# Patient Record
Sex: FEMALE | Race: WHITE | NOT HISPANIC OR LATINO | Employment: FULL TIME | URBAN - METROPOLITAN AREA
[De-identification: names, ages, dates, MRNs, and addresses within clinical notes are randomized per-mention and may not be internally consistent; named-entity substitution may affect disease eponyms.]

---

## 2021-08-03 ENCOUNTER — NON-PROVIDER VISIT (OUTPATIENT)
Dept: URGENT CARE | Facility: CLINIC | Age: 31
End: 2021-08-03

## 2021-08-03 DIAGNOSIS — Z02.1 PRE-EMPLOYMENT DRUG SCREENING: ICD-10-CM

## 2021-08-03 LAB
AMP AMPHETAMINE: NORMAL
COC COCAINE: NORMAL
INT CON NEG: NORMAL
INT CON POS: NORMAL
MET METHAMPHETAMINES: NORMAL
OPI OPIATES: NORMAL
PCP PHENCYCLIDINE: NORMAL
POC DRUG COMMENT 753798-OCCUPATIONAL HEALTH: NEGATIVE
THC: NORMAL

## 2021-08-03 PROCEDURE — 80305 DRUG TEST PRSMV DIR OPT OBS: CPT | Performed by: NURSE PRACTITIONER

## 2022-10-12 ENCOUNTER — HOSPITAL ENCOUNTER (INPATIENT)
Facility: REHABILITATION | Age: 32
End: 2022-10-12
Attending: PHYSICAL MEDICINE & REHABILITATION | Admitting: PHYSICAL MEDICINE & REHABILITATION
Payer: COMMERCIAL

## 2023-04-26 ENCOUNTER — APPOINTMENT (RX ONLY)
Dept: URBAN - METROPOLITAN AREA CLINIC 6 | Facility: CLINIC | Age: 33
Setting detail: DERMATOLOGY
End: 2023-04-26

## 2023-04-26 DIAGNOSIS — Z71.89 OTHER SPECIFIED COUNSELING: ICD-10-CM

## 2023-04-26 DIAGNOSIS — D485 NEOPLASM OF UNCERTAIN BEHAVIOR OF SKIN: ICD-10-CM

## 2023-04-26 PROBLEM — D48.5 NEOPLASM OF UNCERTAIN BEHAVIOR OF SKIN: Status: ACTIVE | Noted: 2023-04-26

## 2023-04-26 PROCEDURE — ? DIAGNOSIS COMMENT

## 2023-04-26 PROCEDURE — ? COUNSELING

## 2023-04-26 PROCEDURE — 99202 OFFICE O/P NEW SF 15 MIN: CPT

## 2023-04-26 PROCEDURE — ? PHOTO-DOCUMENTATION

## 2023-04-26 ASSESSMENT — LOCATION ZONE DERM: LOCATION ZONE: FACE

## 2023-04-26 ASSESSMENT — LOCATION DETAILED DESCRIPTION DERM
LOCATION DETAILED: RIGHT CENTRAL MALAR CHEEK
LOCATION DETAILED: RIGHT SUPERIOR MEDIAL BUCCAL CHEEK

## 2023-04-26 ASSESSMENT — LOCATION SIMPLE DESCRIPTION DERM: LOCATION SIMPLE: RIGHT CHEEK

## 2023-04-26 NOTE — PROCEDURE: PHOTO-DOCUMENTATION
Photo Preface (Leave Blank If You Do Not Want): Photographs were obtained today
Detail Level: Zone
Details (Free Text): Size 4mm

## 2023-04-26 NOTE — PROCEDURE: DIAGNOSIS COMMENT
Comment: Will recheck in 3 month for any changes .
Detail Level: Simple
Render Risk Assessment In Note?: no

## 2023-08-09 ENCOUNTER — APPOINTMENT (RX ONLY)
Dept: URBAN - METROPOLITAN AREA CLINIC 6 | Facility: CLINIC | Age: 33
Setting detail: DERMATOLOGY
End: 2023-08-09

## 2023-08-09 DIAGNOSIS — D22 MELANOCYTIC NEVI: ICD-10-CM

## 2023-08-09 DIAGNOSIS — Z71.89 OTHER SPECIFIED COUNSELING: ICD-10-CM

## 2023-08-09 PROBLEM — D22.39 MELANOCYTIC NEVI OF OTHER PARTS OF FACE: Status: ACTIVE | Noted: 2023-08-09

## 2023-08-09 PROCEDURE — 99212 OFFICE O/P EST SF 10 MIN: CPT

## 2023-08-09 PROCEDURE — ? COUNSELING

## 2023-08-09 ASSESSMENT — LOCATION DETAILED DESCRIPTION DERM
LOCATION DETAILED: RIGHT LATERAL MALAR CHEEK
LOCATION DETAILED: RIGHT INFERIOR MEDIAL UPPER BACK

## 2023-08-09 ASSESSMENT — LOCATION ZONE DERM
LOCATION ZONE: FACE
LOCATION ZONE: TRUNK

## 2023-08-09 ASSESSMENT — LOCATION SIMPLE DESCRIPTION DERM
LOCATION SIMPLE: RIGHT CHEEK
LOCATION SIMPLE: RIGHT UPPER BACK

## 2024-05-17 ENCOUNTER — HOSPITAL ENCOUNTER (OUTPATIENT)
Facility: MEDICAL CENTER | Age: 34
End: 2024-05-17
Attending: OBSTETRICS & GYNECOLOGY
Payer: COMMERCIAL

## 2024-05-17 ENCOUNTER — INITIAL PRENATAL (OUTPATIENT)
Dept: OBGYN | Facility: CLINIC | Age: 34
End: 2024-05-17
Payer: COMMERCIAL

## 2024-05-17 VITALS — SYSTOLIC BLOOD PRESSURE: 136 MMHG | WEIGHT: 210.6 LBS | DIASTOLIC BLOOD PRESSURE: 106 MMHG

## 2024-05-17 DIAGNOSIS — Z34.01 PRIMIGRAVIDA IN FIRST TRIMESTER: Primary | ICD-10-CM

## 2024-05-17 DIAGNOSIS — R03.0 ELEVATED BLOOD PRESSURE READING: ICD-10-CM

## 2024-05-17 DIAGNOSIS — O09.521 MULTIGRAVIDA OF ADVANCED MATERNAL AGE IN FIRST TRIMESTER: ICD-10-CM

## 2024-05-17 PROCEDURE — 3080F DIAST BP >= 90 MM HG: CPT | Performed by: OBSTETRICS & GYNECOLOGY

## 2024-05-17 PROCEDURE — 0501F PRENATAL FLOW SHEET: CPT | Performed by: OBSTETRICS & GYNECOLOGY

## 2024-05-17 PROCEDURE — 3075F SYST BP GE 130 - 139MM HG: CPT | Performed by: OBSTETRICS & GYNECOLOGY

## 2024-05-17 ASSESSMENT — EDINBURGH POSTNATAL DEPRESSION SCALE (EPDS)
I HAVE BEEN SO UNHAPPY THAT I HAVE HAD DIFFICULTY SLEEPING: NOT AT ALL
I HAVE LOOKED FORWARD WITH ENJOYMENT TO THINGS: AS MUCH AS I EVER DID
TOTAL SCORE: 1
THINGS HAVE BEEN GETTING ON TOP OF ME: NO, I HAVE BEEN COPING AS WELL AS EVER
I HAVE BEEN ABLE TO LAUGH AND SEE THE FUNNY SIDE OF THINGS: AS MUCH AS I ALWAYS COULD
I HAVE BEEN ANXIOUS OR WORRIED FOR NO GOOD REASON: HARDLY EVER
I HAVE BEEN SO UNHAPPY THAT I HAVE BEEN CRYING: NO, NEVER
THE THOUGHT OF HARMING MYSELF HAS OCCURRED TO ME: NEVER
I HAVE FELT SAD OR MISERABLE: NO, NOT AT ALL
I HAVE FELT SCARED OR PANICKY FOR NO GOOD REASON: NO, NOT AT ALL
I HAVE BLAMED MYSELF UNNECESSARILY WHEN THINGS WENT WRONG: NO, NEVER

## 2024-05-17 NOTE — PROGRESS NOTES
Establish Pregnancy Visit    CC: First OB Visit    HPI: Patient is a 33 y.o.  at 11w4d by LMP who presents for her first OB visit.  She is not yet feeling fetal movement.  She denies vaginal bleeding or persistent cramping.  She has not had a lot of pregnancy symptoms such as nausea/vomiting or fatigue.  Overall, she has been doing well.  She presents with her partner, Colin and they are excited about the pregnancy.    She has a past medical history of chlamydia, but no history of herpes.    Procedure:  Transvaginal US performed by me and per my read:    Indication: Amenorrhea, +UPT.     Findings:   [place] intrauterine pregnancy @ 12w1d by CRL.   Positive fetal cardiac activity      Impression:   Viable IUP @ 12w1d.  DAVID by US of 2024.  DAVID by LMP: 2024  Final DAVID: 2024    GYN HX:   Last Pap:  neg  Hx Moderate or Severe Dysplasia : no  Hx STD : yes - chlamydia    OBSTETRIC HISTORY:  OB History    Para Term  AB Living   1             SAB IAB Ectopic Molar Multiple Live Births                    # Outcome Date GA Lbr Altaf/2nd Weight Sex Delivery Anes PTL Lv   1 Current                MEDICAL HISTORY:  History reviewed. No pertinent past medical history.    MEDICATIONS:  No current outpatient medications on file prior to visit.     No current facility-administered medications on file prior to visit.       FAMILY HISTORY:  Family History   Problem Relation Age of Onset    Heart Disease Father        SURGICAL HISTORY:  History reviewed. No pertinent surgical history.    ALLERGIES / REACTIONS:  Not on File             SOCIAL HISTORY:   reports that she has never smoked. She has never used smokeless tobacco. She reports that she does not drink alcohol and does not use drugs.    ROS:   Gen: no fevers or chills, no significant weight loss or gain, excessive fatigue  Respiratory:  no cough or dyspnea  Cardiac:  no chest pain, no palpitations, no syncope  Breast: no breast discharge,  pain, lump or skin changes  GI:  no heartburn, no abdominal pain, no nausea or vomiting  Urinary: no dysuria, urgency, frequency, incontinence   Psych: no depression or anxiety  Neuro: no migraines with aura, fainting spells, numbness or tingling  Extremities: no joint pain, persistently swollen ankles, recurrent leg cramps       PHYSICAL EXAMINATION:  Vital Signs:   Vitals:    05/17/24 0823   BP: (!) 136/106   Weight: 210 lb 9.6 oz     There is no height or weight on file to calculate BMI.  Constitutional: The patient is well developed and well nourished.  Psychiatric: Patient is oriented to time place and person.   Skin: No rash observed.  Neck: Neck appears symmetric.   Respiratory: normal effort  Abdomen: Soft, non-tender.  Pelvic:    Vulva: normal.    Urethra: normal.   Vagina: normal.    Cervix: normal.    Uterus: consistent with dates    Adnexa: normal.   Perineum: normal.   GC / Chlamydia cultures obtained.   Pap Smear Obtained: yes  Extremeties: Legs are symmetric and without tenderness. There is no edema present.    ACOG SCREENING  Infection Prevention  1. High Risk For HIV: No 6. Rash Or Illness Since LMP: No     2. High Risk For Hepatitis B or C: No 7. History Of STD, GC, Chlamydia, HPV Syphilis: Yes (Comment: 2011 Chlamydia)     3. Live With Someone With TB Or Exposed To TB: No 8. Have a cat in the home?: Yes     4. Patient Or Partner Has A History Of Herpes: No 8a. Responsible for changing the litter?: No (Comment: Spouse changes litter)     5. History of Chicken Pox: No             Genetic Screening/Teratology Counseling- Includes patient, baby's father, or anyone in either family with:  Patient's age 35 years or older as of estimated date of delivery: No     Thalassemia (Italian, Greek, Mediterranean, or  background): MCV less than 80: No     Neural tube defect (Meningomyelocele, Spina bifida, or Anencephaly): No     Congenital heart defect: No     Down syndrome: No     Shailesh-Sachs (Ashkenazi  Orthodoxy, Cajun, Nepali [place]): No     Canavan disease (Ashkenazi Orthodoxy): No     Familial dysautonomia (Ashkenazi Orthodoxy): No     Sickle cell disease or trait (): No     Hemophilia or other blood disorders: No     Muscular dystrophy: No    Cystic fibrosis: No     Thayer's chorea: No     Mental retardation/autism: No     Other inherited genetic or chromosomal disorder: No     Maternal metabolic disorder (eg. Type 1 diabetes, PKU): No     Patient or baby's father had child with birth defects not listed above: No     Recurrent pregnancy loss, or a stillbirth: No     Medications (including supplements, vitamins, herbs, or OTC drugs)/illicit/recreational drugs/alcohol since last menstrual period: No                 ASSESSMENT AND PLAN:  33 y.o.  at 11w4d     1. Primigravida in first trimester  - PREG CNTR PRENATAL PN; Future  - URINE DRUG SCREEN W/CONF (AR); Future  - PANORAMA PRENATAL TEST  - THINPREP PAP W/HPV AND CTNG; Future  - HEMOGLOBIN A1C; Future    2. Elevated blood pressure reading  -Patient reports that she is nervous.  She denies any history of high blood pressure.  Plan to continue to monitor.    3. Multigravida of advanced maternal age in first trimester  - I discussed that she could start aspirin 81 mg at 12 weeks to help prevent blood pressure issues at the end of pregnancy.  She will think about it.    - PNL ordered and std screening completed   - Dating reviewed: Dated by LMP c/w US  - Discussed options for genetic/aneuploidy testing and information given for pt to consider.  Advised to call insurance for cost of testing.           - she currently desires aneuploidy testing.           - she currently desires CF/SMA testing.  - Discussed office policies, prenatal care timeline, weight gain, diet and activity.  - Taking PNV.  - Increase water intake and encouraged healthy nutrition. Encouraged moderate exercise may continue into final trimester.     Return in 4 weeks for next prenatal  visit    Silvia Young M.D.

## 2024-05-17 NOTE — PROGRESS NOTES
Pt here for NOB visit  LMP: 2/26/24     : 5/17/24   DAVID:12/2/24  Confirmed good ph#, pharmacy, drug allergy, and medications on file.  Last Pap:2022 Neg. Per pt Dr. Lu  Pt declines CF.  Pt possible desires AFP.  Pt states no other complaints for today.  EPDS:

## 2024-05-18 DIAGNOSIS — Z34.01 PRIMIGRAVIDA IN FIRST TRIMESTER: ICD-10-CM

## 2024-05-22 LAB
C TRACH RRNA CVX QL NAA+PROBE: NEGATIVE
CYTOLOGIST CVX/VAG CYTO: NORMAL
CYTOLOGY CVX/VAG DOC CYTO: NORMAL
CYTOLOGY CVX/VAG DOC THIN PREP: NORMAL
HPV I/H RISK 4 DNA CVX QL PROBE+SIG AMP: NEGATIVE
N GONORRHOEA RRNA CVX QL NAA+PROBE: NEGATIVE
NOTE NL11727A: NORMAL
OTHER STN SPEC: NORMAL
QC REVIEWED BY NL11722A: NORMAL
STAT OF ADQ CVX/VAG CYTO-IMP: NORMAL

## 2024-05-23 ENCOUNTER — HOSPITAL ENCOUNTER (OUTPATIENT)
Dept: LAB | Facility: MEDICAL CENTER | Age: 34
End: 2024-05-23
Attending: OBSTETRICS & GYNECOLOGY
Payer: COMMERCIAL

## 2024-05-23 DIAGNOSIS — Z34.01 PRIMIGRAVIDA IN FIRST TRIMESTER: ICD-10-CM

## 2024-05-23 LAB
ABO GROUP BLD: NORMAL
BLD GP AB SCN SERPL QL: NORMAL
EST. AVERAGE GLUCOSE BLD GHB EST-MCNC: 100 MG/DL
HBA1C MFR BLD: 5.1 % (ref 4–5.6)
RH BLD: NORMAL

## 2024-05-24 LAB
ERYTHROCYTE [DISTWIDTH] IN BLOOD BY AUTOMATED COUNT: 41.4 FL (ref 35.9–50)
HBV SURFACE AG SER QL: ABNORMAL
HCT VFR BLD AUTO: 40.9 % (ref 37–47)
HCV AB SER QL: ABNORMAL
HGB BLD-MCNC: 14.2 G/DL (ref 12–16)
HIV 1+2 AB+HIV1 P24 AG SERPL QL IA: NORMAL
MCH RBC QN AUTO: 30.9 PG (ref 27–33)
MCHC RBC AUTO-ENTMCNC: 34.7 G/DL (ref 32.2–35.5)
MCV RBC AUTO: 88.9 FL (ref 81.4–97.8)
PLATELET # BLD AUTO: 170 K/UL (ref 164–446)
PMV BLD AUTO: 12.3 FL (ref 9–12.9)
RBC # BLD AUTO: 4.6 M/UL (ref 4.2–5.4)
RUBV AB SER QL: 78.7 IU/ML
T PALLIDUM AB SER QL IA: ABNORMAL
WBC # BLD AUTO: 11.2 K/UL (ref 4.8–10.8)

## 2024-05-25 LAB
AMPHET CTO UR CFM-MCNC: NEGATIVE NG/ML
BACTERIA UR CULT: ABNORMAL
BACTERIA UR CULT: ABNORMAL
BARBITURATES CTO UR CFM-MCNC: NEGATIVE NG/ML
BENZODIAZ CTO UR CFM-MCNC: NEGATIVE NG/ML
CANNABINOIDS CTO UR CFM-MCNC: NEGATIVE NG/ML
COCAINE CTO UR CFM-MCNC: NEGATIVE NG/ML
CREAT UR-MCNC: 135.9 MG/DL (ref 20–400)
DRUG COMMENT 753798: NORMAL
METHADONE CTO UR CFM-MCNC: NEGATIVE NG/ML
OPIATES CTO UR CFM-MCNC: NEGATIVE NG/ML
PCP CTO UR CFM-MCNC: NEGATIVE NG/ML
PROPOXYPH CTO UR CFM-MCNC: NEGATIVE NG/ML
SIGNIFICANT IND 70042: ABNORMAL
SITE SITE: ABNORMAL
SOURCE SOURCE: ABNORMAL

## 2024-05-30 DIAGNOSIS — N39.0 URINARY TRACT INFECTION WITHOUT HEMATURIA, SITE UNSPECIFIED: ICD-10-CM

## 2024-05-30 RX ORDER — NITROFURANTOIN 25; 75 MG/1; MG/1
100 CAPSULE ORAL 2 TIMES DAILY
Qty: 14 CAPSULE | Refills: 0 | Status: SHIPPED | OUTPATIENT
Start: 2024-05-30 | End: 2024-06-06

## 2024-05-31 LAB
CF EXPANDED VARIANT PANEL INTERP Q4864: NORMAL
CFTR ALLELE 1 BLD/T QL: NEGATIVE
CFTR ALLELE 1 BLD/T QL: NEGATIVE
CFTR MUT ANL BLD/T: NORMAL
FMR1 ALLELE 2 CGG RPT ENTNUM BLD/T: 20 CGG REPEATS
FMR1 ALLELE1 CGG RPT ENTNUM BLD/T: 30 CGG REPEATS
FMR1 GENE MUT ANL BLD/T: NORMAL
FMR1 GENE MUT ANL BLD/T: NORMAL
GEN STRUCT VAR COPY NUM: NORMAL
SMN1 GENE MUT ANL BLD/T: NORMAL
SMN1+SMN2 GENE MUT ANL BLD/T: NORMAL
SMN2 GENE MUT ANL BLD/T: NORMAL
SPECIMEN SOURCE: NORMAL
SYMPTOM: NORMAL

## 2024-06-03 LAB
Lab: NORMAL
NTRA 1P36 DELETION SYNDROME POPULATION-BASED RISK TEXT: NORMAL
NTRA 1P36 DELETION SYNDROME RESULT TEXT: NORMAL
NTRA 1P36 DELETION SYNDROME RISK SCORE TEXT: NORMAL
NTRA 22Q11.2 DELETION SYNDROME POPULATION-BASED RISK TEXT: NORMAL
NTRA 22Q11.2 DELETION SYNDROME RESULT TEXT: NORMAL
NTRA 22Q11.2 DELETION SYNDROME RISK SCORE TEXT: NORMAL
NTRA ANGELMAN SYNDROME POPULATION-BASED RISK TEXT: NORMAL
NTRA ANGELMAN SYNDROME RESULT TEXT: NORMAL
NTRA ANGELMAN SYNDROME RISK SCORE TEXT: NORMAL
NTRA CRI-DU-CHAT SYNDROME POPULATION-BASED RISK TEXT: NORMAL
NTRA CRI-DU-CHAT SYNDROME RESULT TEXT: NORMAL
NTRA CRI-DU-CHAT SYNDROME RISK SCORE TEXT: NORMAL
NTRA FETAL FRACTION: NORMAL
NTRA GENDER OF FETUS: NORMAL
NTRA MONOSOMY X AGE-BASED RISK TEXT: NORMAL
NTRA MONOSOMY X RESULT TEXT: NORMAL
NTRA MONOSOMY X RISK SCORE TEXT: NORMAL
NTRA PRADER-WILLI SYNDROME POPULATION-BASED RISK TEXT: NORMAL
NTRA PRADER-WILLI SYNDROME RESULT TEXT: NORMAL
NTRA PRADER-WILLI SYNDROME RISK SCORE TEXT: NORMAL
NTRA TRIPLOIDY RESULT TEXT: NORMAL
NTRA TRISOMY 13 AGE-BASED RISK TEXT: NORMAL
NTRA TRISOMY 13 RESULT TEXT: NORMAL
NTRA TRISOMY 13 RISK SCORE TEXT: NORMAL
NTRA TRISOMY 18 AGE-BASED RISK TEXT: NORMAL
NTRA TRISOMY 18 RESULT TEXT: NORMAL
NTRA TRISOMY 18 RISK SCORE TEXT: NORMAL
NTRA TRISOMY 21 AGE-BASED RISK TEXT: NORMAL
NTRA TRISOMY 21 RESULT TEXT: NORMAL
NTRA TRISOMY 21 RISK SCORE TEXT: NORMAL

## 2024-06-14 ENCOUNTER — ROUTINE PRENATAL (OUTPATIENT)
Dept: OBGYN | Facility: CLINIC | Age: 34
End: 2024-06-14
Payer: COMMERCIAL

## 2024-06-14 VITALS — SYSTOLIC BLOOD PRESSURE: 126 MMHG | WEIGHT: 211.9 LBS | DIASTOLIC BLOOD PRESSURE: 77 MMHG

## 2024-06-14 DIAGNOSIS — Z3A.15 15 WEEKS GESTATION OF PREGNANCY: ICD-10-CM

## 2024-06-14 DIAGNOSIS — Z34.02 ENCOUNTER FOR SUPERVISION OF NORMAL FIRST PREGNANCY IN SECOND TRIMESTER: ICD-10-CM

## 2024-06-14 PROCEDURE — 0502F SUBSEQUENT PRENATAL CARE: CPT | Performed by: OBSTETRICS & GYNECOLOGY

## 2024-06-14 PROCEDURE — 3074F SYST BP LT 130 MM HG: CPT | Performed by: OBSTETRICS & GYNECOLOGY

## 2024-06-14 PROCEDURE — 3078F DIAST BP <80 MM HG: CPT | Performed by: OBSTETRICS & GYNECOLOGY

## 2024-06-14 NOTE — PROGRESS NOTES
Pt. Here for OB/FU.   15w4d  Reports no FM yet.   Pt. Denies VB, LOF, or UC's.     Pt states no concerns.     Phone/Pharm verified.

## 2024-06-25 ENCOUNTER — HOSPITAL ENCOUNTER (OUTPATIENT)
Dept: LAB | Facility: MEDICAL CENTER | Age: 34
End: 2024-06-25
Attending: STUDENT IN AN ORGANIZED HEALTH CARE EDUCATION/TRAINING PROGRAM
Payer: COMMERCIAL

## 2024-06-25 DIAGNOSIS — Z3A.15 15 WEEKS GESTATION OF PREGNANCY: ICD-10-CM

## 2024-06-25 PROCEDURE — 87086 URINE CULTURE/COLONY COUNT: CPT

## 2024-06-25 PROCEDURE — 82105 ALPHA-FETOPROTEIN SERUM: CPT

## 2024-06-25 PROCEDURE — 36415 COLL VENOUS BLD VENIPUNCTURE: CPT

## 2024-06-27 LAB
# FETUSES US: NORMAL
AFP MOM SERPL: 1.14
AFP SERPL-MCNC: 23 NG/ML
AGE - REPORTED: 35.5 YR
BACTERIA UR CULT: NORMAL
CURRENT SMOKER: NO
FAMILY MEMBER DISEASES HX: NO
GA METHOD: NORMAL
GA: NORMAL WK
IDDM PATIENT QL: NO
INTEGRATED SCN PATIENT-IMP: NORMAL
SIGNIFICANT IND 70042: NORMAL
SITE SITE: NORMAL
SOURCE SOURCE: NORMAL
SPECIMEN DRAWN SERPL: NORMAL

## 2024-07-10 ENCOUNTER — ROUTINE PRENATAL (OUTPATIENT)
Dept: OBGYN | Facility: CLINIC | Age: 34
End: 2024-07-10
Payer: COMMERCIAL

## 2024-07-10 VITALS — SYSTOLIC BLOOD PRESSURE: 137 MMHG | WEIGHT: 216 LBS | DIASTOLIC BLOOD PRESSURE: 87 MMHG

## 2024-07-10 DIAGNOSIS — Z34.02 ENCOUNTER FOR SUPERVISION OF NORMAL FIRST PREGNANCY IN SECOND TRIMESTER: ICD-10-CM

## 2024-07-10 PROCEDURE — 3079F DIAST BP 80-89 MM HG: CPT | Performed by: STUDENT IN AN ORGANIZED HEALTH CARE EDUCATION/TRAINING PROGRAM

## 2024-07-10 PROCEDURE — 3075F SYST BP GE 130 - 139MM HG: CPT | Performed by: STUDENT IN AN ORGANIZED HEALTH CARE EDUCATION/TRAINING PROGRAM

## 2024-07-10 PROCEDURE — 0502F SUBSEQUENT PRENATAL CARE: CPT | Performed by: STUDENT IN AN ORGANIZED HEALTH CARE EDUCATION/TRAINING PROGRAM

## 2024-07-17 ENCOUNTER — HOSPITAL ENCOUNTER (OUTPATIENT)
Dept: RADIOLOGY | Facility: MEDICAL CENTER | Age: 34
End: 2024-07-17
Attending: OBSTETRICS & GYNECOLOGY
Payer: COMMERCIAL

## 2024-07-17 DIAGNOSIS — Z34.02 ENCOUNTER FOR SUPERVISION OF NORMAL FIRST PREGNANCY IN SECOND TRIMESTER: ICD-10-CM

## 2024-07-17 DIAGNOSIS — Z3A.15 15 WEEKS GESTATION OF PREGNANCY: ICD-10-CM

## 2024-07-17 PROCEDURE — 76805 OB US >/= 14 WKS SNGL FETUS: CPT

## 2024-08-05 ENCOUNTER — APPOINTMENT (OUTPATIENT)
Dept: OBGYN | Facility: CLINIC | Age: 34
End: 2024-08-05
Payer: COMMERCIAL

## 2024-08-05 VITALS — DIASTOLIC BLOOD PRESSURE: 82 MMHG | SYSTOLIC BLOOD PRESSURE: 135 MMHG | WEIGHT: 218 LBS

## 2024-08-05 DIAGNOSIS — Z34.92 SECOND TRIMESTER PREGNANCY: ICD-10-CM

## 2024-08-05 PROCEDURE — 3079F DIAST BP 80-89 MM HG: CPT | Performed by: OBSTETRICS & GYNECOLOGY

## 2024-08-05 PROCEDURE — 3075F SYST BP GE 130 - 139MM HG: CPT | Performed by: OBSTETRICS & GYNECOLOGY

## 2024-08-05 PROCEDURE — 0502F SUBSEQUENT PRENATAL CARE: CPT | Performed by: OBSTETRICS & GYNECOLOGY

## 2024-08-05 NOTE — PROGRESS NOTES
Pt here today for OB follow up @23 weeks =EDC:12/2/2024  Pt states doing well and now daily active FM.  Denies bleeding or leaking fluid, pain or cramping  RH Negative .26 week labs pend.  Good # [phone]  Pharmacy Confirmed [facility]

## 2024-08-26 ENCOUNTER — HOSPITAL ENCOUNTER (OUTPATIENT)
Dept: LAB | Facility: MEDICAL CENTER | Age: 34
End: 2024-08-26
Attending: OBSTETRICS & GYNECOLOGY
Payer: COMMERCIAL

## 2024-08-26 LAB
BLD GP AB SCN SERPL QL: NORMAL
ERYTHROCYTE [DISTWIDTH] IN BLOOD BY AUTOMATED COUNT: 46.7 FL (ref 35.9–50)
GLUCOSE 1H P 50 G GLC PO SERPL-MCNC: 103 MG/DL (ref 70–139)
HCT VFR BLD AUTO: 38.3 % (ref 37–47)
HGB BLD-MCNC: 12.8 G/DL (ref 12–16)
MCH RBC QN AUTO: 31.2 PG (ref 27–33)
MCHC RBC AUTO-ENTMCNC: 33.4 G/DL (ref 32.2–35.5)
MCV RBC AUTO: 93.4 FL (ref 81.4–97.8)
PLATELET # BLD AUTO: 159 K/UL (ref 164–446)
PMV BLD AUTO: 12 FL (ref 9–12.9)
RBC # BLD AUTO: 4.1 M/UL (ref 4.2–5.4)
T PALLIDUM AB SER QL IA: NORMAL
WBC # BLD AUTO: 11.6 K/UL (ref 4.8–10.8)

## 2024-08-26 PROCEDURE — 82950 GLUCOSE TEST: CPT

## 2024-08-26 PROCEDURE — 85027 COMPLETE CBC AUTOMATED: CPT

## 2024-08-26 PROCEDURE — 86780 TREPONEMA PALLIDUM: CPT

## 2024-08-26 PROCEDURE — 36415 COLL VENOUS BLD VENIPUNCTURE: CPT

## 2024-08-26 PROCEDURE — 86850 RBC ANTIBODY SCREEN: CPT

## 2024-09-03 ENCOUNTER — APPOINTMENT (OUTPATIENT)
Dept: OBGYN | Facility: CLINIC | Age: 34
End: 2024-09-03
Payer: COMMERCIAL

## 2024-09-03 VITALS — WEIGHT: 226 LBS | SYSTOLIC BLOOD PRESSURE: 134 MMHG | DIASTOLIC BLOOD PRESSURE: 84 MMHG

## 2024-09-03 DIAGNOSIS — O26.892 RH NEGATIVE STATE IN ANTEPARTUM PERIOD, SECOND TRIMESTER: ICD-10-CM

## 2024-09-03 DIAGNOSIS — Z67.91 RH NEGATIVE STATE IN ANTEPARTUM PERIOD, SECOND TRIMESTER: ICD-10-CM

## 2024-09-03 DIAGNOSIS — Z3A.27 27 WEEKS GESTATION OF PREGNANCY: ICD-10-CM

## 2024-09-03 PROCEDURE — 0502F SUBSEQUENT PRENATAL CARE: CPT | Performed by: STUDENT IN AN ORGANIZED HEALTH CARE EDUCATION/TRAINING PROGRAM

## 2024-09-03 PROCEDURE — 90471 IMMUNIZATION ADMIN: CPT | Performed by: STUDENT IN AN ORGANIZED HEALTH CARE EDUCATION/TRAINING PROGRAM

## 2024-09-03 PROCEDURE — 90715 TDAP VACCINE 7 YRS/> IM: CPT | Mod: JZ | Performed by: STUDENT IN AN ORGANIZED HEALTH CARE EDUCATION/TRAINING PROGRAM

## 2024-09-03 RX ORDER — ASPIRIN 81 MG/1
81 TABLET, CHEWABLE ORAL DAILY
COMMUNITY

## 2024-09-03 NOTE — PROGRESS NOTES
Patient here for OB Follow Up  Patient reports +FM  Patient denies LOF, VB or UC's.  Good # [phone]  Pharmacy Confirmed  CARLO given.   Patient has no other concerns at this time.

## 2024-09-03 NOTE — LETTER
"Count Your Baby's Movements  Another step to a healthy delivery    Jami Gregory  [facility]  Dept: [phone]    How Many Weeks Pregnant? 27w1d    Date to Begin Counting: September 3, 2024              How to use this chart    One way for your physician to keep track of your baby's health is by knowing how often the baby moves (or \"kicks\") in your womb.  You can help your physician to do this by using this chart every day.    Every day, you should see how many hours it takes for your baby to move 10 times.  Start in the morning, as soon as you get up.    First, write down the time your baby moves until you get to 10.  Check off one box every time your baby moves until you get to 10.  Write down the time you finished counting in the last column.  Total how long it took to count up all 10 movements.  Finally, fill in the box that shows how long this took.  After counting 10 movements, you no longer have to count any more that day.  The next morning, just start counting again as soon as you get up.    What should you call a \"movement\"?  It is hard to say, because it will feel different from one mother to another and from one pregnancy to the next.  The important thing is that you count the movements the same way throughout your pregnancy.  If you have more questions, you should ask your physician.    Count carefully every day!  SAMPLE:  Week 28    How many hours did it take to feel 10 movements?       Start  Time     1     2     3     4     5     6     7     8     9     10   Finish Time   Mon 8:20           11:40   Tue Wed Thu               Fri               Sat               Sun                 IMPORTANT: You should contact your physician if it takes more than two hours for you to feel 10 movements.  Each morning, write down the time and start to count the movements of your baby.  Keep track by checking off one box every time you feel one movement.  When " "you have felt 10 \"kicks\", write down the time you finished counting in the last column.  Then fill in the   box (over the check samson) for the number of hours it took.  Be sure to read the complete instructions on the previous page.            "

## 2024-09-04 NOTE — PROGRESS NOTES
OB Visit Note - 27w1d     Pregnancy complicated by:  Patient Active Problem List   Diagnosis    Elevated blood pressure reading    Multigravida of advanced maternal age in first trimester         SUBJECTIVE:  Jami Gregory is a 34 y.o.,  at 27w1d who presents for pregnancy follow-up. . She states the baby is moving. She accepts the Tdap. She accepts Rhogam. Anatomy scan and 28 weeks labs were WNL    ROS:  She denies vaginal bleeding, leakage of fluid, or contractions.    She denies nausea or vomiting or headache    OBJECTIVE:  Vital Signs: /84   Wt 226 lb   LMP 2024   Appearance/Psychiatric: to be in no distress  Constitutional: The patient is well nourished.  Respiratory:Respiratory effort is normal.  Gastrointestinal: Abdomen is soft, gravid, non-tendern,no rashes, heart tones are present, fundal height is consistent with dates  FH 29    Extremities: no edema   Latest Reference Range & Units 24 13:35 24 14:13   Hemoglobin 12.0 - 16.0 g/dL 12.8    Hematocrit 37.0 - 47.0 % 38.3    Platelet Count 164 - 446 K/uL 159 (L)    Glucose, Post Dose 70 - 139 mg/dL 103    Syphilis, Treponemal Qual Non-Reactive  Non-Reactive    Antibody Screen Scrn   NEG   (L): Data is abnormally low  1. Rh negative state in antepartum period, second trimester  rho d immune globulin 1500 unit/2 mL    Consent for Rhogam      2. 27 weeks gestation of pregnancy  TDAP VACCINE =>8YO IM        Jami Gregory is a 34 y.o. female  at 27w1d here for VIKI. Doing well. She received the Tdap and rhogam. Her labs and anatomy scan were normal.     -counseled on flu, RSV and Covid vaccination in pregnancy  -all questions answered and pt agreeable to plan of care    The patient will follow up in 2-3 week(s). She was counseled to call or return for vaginal bleeding, regular contractions, leakage of fluid or decreased fetal movement.    Jami Olmos DO, FACOG  [facility]

## 2024-09-18 ENCOUNTER — ROUTINE PRENATAL (OUTPATIENT)
Dept: OBGYN | Facility: CLINIC | Age: 34
End: 2024-09-18
Payer: COMMERCIAL

## 2024-09-18 VITALS — SYSTOLIC BLOOD PRESSURE: 134 MMHG | DIASTOLIC BLOOD PRESSURE: 79 MMHG | WEIGHT: 231 LBS

## 2024-09-18 DIAGNOSIS — Z67.91 RH NEGATIVE STATE IN ANTEPARTUM PERIOD: ICD-10-CM

## 2024-09-18 DIAGNOSIS — O26.899 RH NEGATIVE STATE IN ANTEPARTUM PERIOD: ICD-10-CM

## 2024-09-18 PROBLEM — O09.523 MULTIGRAVIDA OF ADVANCED MATERNAL AGE IN THIRD TRIMESTER: Status: ACTIVE | Noted: 2024-05-17

## 2024-09-18 PROCEDURE — 0502F SUBSEQUENT PRENATAL CARE: CPT | Performed by: NURSE PRACTITIONER

## 2024-09-18 PROCEDURE — 3075F SYST BP GE 130 - 139MM HG: CPT | Performed by: NURSE PRACTITIONER

## 2024-09-18 PROCEDURE — 3078F DIAST BP <80 MM HG: CPT | Performed by: NURSE PRACTITIONER

## 2024-09-18 NOTE — PROGRESS NOTES
SUBJECTIVE:  Pt is a 34 y.o.   at 29w2d  gestation. Presents today for follow-up prenatal care. Reports good  fetal movement. Denies regular cramping/contractions, bleeding or leaking of fluid. Denies dysuria, headaches, N/V. Generally feels well today. Reports her blood pressure readings at home have been all normal.      OBJECTIVE:  - See prenatal vitals flow  -   Vitals:    24 0808   BP: 134/79   Weight: 231 lb                 ASSESSMENT:   - IUP at 29w2d    - S=D   -   Patient Active Problem List    Diagnosis Date Noted    Rh negative state in antepartum period 2024    Elevated blood pressure reading 2024    Multigravida of advanced maternal age in third trimester 2024         PLAN:  - S/sx pregnancy and labor warning signs vs general discomforts discussed  - Fetal movements and/or kick counts reviewed   - Adequate hydration reinforced  - Nutrition/exercise/vitamin education; continue PNV  - Plans unsure for contraception Pp: handout given and reviewed  - Has done a tour of Ln[facility] and is signing up for classes as well  - Anticipatory guidance given  - RTC in 2-3 weeks for follow-up prenatal care

## 2024-09-18 NOTE — PROGRESS NOTES
Pt. Here for OB/FU.   Reports Good FM.   Chaperone offered.   Pt states she has no concerns.   Phone/Pharm verified.      Pt states she received her Flu and COVID booster a couple weeks ago.

## 2024-10-01 ENCOUNTER — APPOINTMENT (OUTPATIENT)
Dept: OBGYN | Facility: CLINIC | Age: 34
End: 2024-10-01
Payer: COMMERCIAL

## 2024-10-01 ENCOUNTER — HOSPITAL ENCOUNTER (EMERGENCY)
Facility: MEDICAL CENTER | Age: 34
End: 2024-10-01
Attending: OBSTETRICS & GYNECOLOGY | Admitting: OBSTETRICS & GYNECOLOGY
Payer: COMMERCIAL

## 2024-10-01 VITALS
TEMPERATURE: 97.7 F | DIASTOLIC BLOOD PRESSURE: 64 MMHG | HEART RATE: 93 BPM | BODY MASS INDEX: 38.99 KG/M2 | SYSTOLIC BLOOD PRESSURE: 115 MMHG | OXYGEN SATURATION: 98 % | HEIGHT: 65 IN | RESPIRATION RATE: 16 BRPM | WEIGHT: 234 LBS

## 2024-10-01 VITALS — DIASTOLIC BLOOD PRESSURE: 74 MMHG | WEIGHT: 234 LBS | SYSTOLIC BLOOD PRESSURE: 148 MMHG

## 2024-10-01 DIAGNOSIS — O09.521 MULTIGRAVIDA OF ADVANCED MATERNAL AGE IN FIRST TRIMESTER: ICD-10-CM

## 2024-10-01 DIAGNOSIS — O14.93 PRE-ECLAMPSIA IN THIRD TRIMESTER: ICD-10-CM

## 2024-10-01 DIAGNOSIS — O14.90 PRE-ECLAMPSIA AFFECTING PREGNANCY, ANTEPARTUM: ICD-10-CM

## 2024-10-01 LAB
ALBUMIN SERPL BCP-MCNC: 3.2 G/DL (ref 3.2–4.9)
ALBUMIN/GLOB SERPL: 1.3 G/DL
ALP SERPL-CCNC: 76 U/L (ref 30–99)
ALT SERPL-CCNC: 8 U/L (ref 2–50)
ANION GAP SERPL CALC-SCNC: 8 MMOL/L (ref 7–16)
AST SERPL-CCNC: 15 U/L (ref 12–45)
BASOPHILS # BLD AUTO: 0.2 % (ref 0–1.8)
BASOPHILS # BLD: 0.03 K/UL (ref 0–0.12)
BILIRUB SERPL-MCNC: <0.2 MG/DL (ref 0.1–1.5)
BUN SERPL-MCNC: 8 MG/DL (ref 8–22)
CALCIUM ALBUM COR SERPL-MCNC: 9.5 MG/DL (ref 8.5–10.5)
CALCIUM SERPL-MCNC: 8.9 MG/DL (ref 8.5–10.5)
CHLORIDE SERPL-SCNC: 109 MMOL/L (ref 96–112)
CO2 SERPL-SCNC: 20 MMOL/L (ref 20–33)
CREAT SERPL-MCNC: 0.64 MG/DL (ref 0.5–1.4)
CREAT UR-MCNC: 16 MG/DL
EOSINOPHIL # BLD AUTO: 0.25 K/UL (ref 0–0.51)
EOSINOPHIL NFR BLD: 1.9 % (ref 0–6.9)
ERYTHROCYTE [DISTWIDTH] IN BLOOD BY AUTOMATED COUNT: 43.8 FL (ref 35.9–50)
GFR SERPLBLD CREATININE-BSD FMLA CKD-EPI: 118 ML/MIN/1.73 M 2
GLOBULIN SER CALC-MCNC: 2.5 G/DL (ref 1.9–3.5)
GLUCOSE SERPL-MCNC: 116 MG/DL (ref 65–99)
HCT VFR BLD AUTO: 34.9 % (ref 37–47)
HGB BLD-MCNC: 12.4 G/DL (ref 12–16)
IMM GRANULOCYTES # BLD AUTO: 0.2 K/UL (ref 0–0.11)
IMM GRANULOCYTES NFR BLD AUTO: 1.5 % (ref 0–0.9)
LYMPHOCYTES # BLD AUTO: 1.95 K/UL (ref 1–4.8)
LYMPHOCYTES NFR BLD: 14.8 % (ref 22–41)
MCH RBC QN AUTO: 32 PG (ref 27–33)
MCHC RBC AUTO-ENTMCNC: 35.5 G/DL (ref 32.2–35.5)
MCV RBC AUTO: 89.9 FL (ref 81.4–97.8)
MONOCYTES # BLD AUTO: 0.49 K/UL (ref 0–0.85)
MONOCYTES NFR BLD AUTO: 3.7 % (ref 0–13.4)
NEUTROPHILS # BLD AUTO: 10.23 K/UL (ref 1.82–7.42)
NEUTROPHILS NFR BLD: 77.9 % (ref 44–72)
NRBC # BLD AUTO: 0 K/UL
NRBC BLD-RTO: 0 /100 WBC (ref 0–0.2)
PLATELET # BLD AUTO: 153 K/UL (ref 164–446)
PMV BLD AUTO: 11.7 FL (ref 9–12.9)
POTASSIUM SERPL-SCNC: 3.8 MMOL/L (ref 3.6–5.5)
PROT SERPL-MCNC: 5.7 G/DL (ref 6–8.2)
PROT UR-MCNC: <6 MG/DL (ref 0–15)
PROT/CREAT UR: 375 MG/G (ref 10–107)
RBC # BLD AUTO: 3.88 M/UL (ref 4.2–5.4)
SODIUM SERPL-SCNC: 137 MMOL/L (ref 135–145)
WBC # BLD AUTO: 13.2 K/UL (ref 4.8–10.8)

## 2024-10-01 PROCEDURE — 59025 FETAL NON-STRESS TEST: CPT

## 2024-10-01 PROCEDURE — 99283 EMERGENCY DEPT VISIT LOW MDM: CPT | Performed by: OBSTETRICS & GYNECOLOGY

## 2024-10-01 PROCEDURE — 36415 COLL VENOUS BLD VENIPUNCTURE: CPT

## 2024-10-01 PROCEDURE — 0502F SUBSEQUENT PRENATAL CARE: CPT | Performed by: OBSTETRICS & GYNECOLOGY

## 2024-10-01 PROCEDURE — 80053 COMPREHEN METABOLIC PANEL: CPT

## 2024-10-01 PROCEDURE — 3078F DIAST BP <80 MM HG: CPT | Performed by: OBSTETRICS & GYNECOLOGY

## 2024-10-01 PROCEDURE — 84156 ASSAY OF PROTEIN URINE: CPT

## 2024-10-01 PROCEDURE — 82570 ASSAY OF URINE CREATININE: CPT

## 2024-10-01 PROCEDURE — 3077F SYST BP >= 140 MM HG: CPT | Performed by: OBSTETRICS & GYNECOLOGY

## 2024-10-01 PROCEDURE — 85025 COMPLETE CBC W/AUTO DIFF WBC: CPT

## 2024-10-01 PROCEDURE — 99282 EMERGENCY DEPT VISIT SF MDM: CPT

## 2024-10-01 ASSESSMENT — PAIN SCALES - GENERAL: PAINLEVEL: 2

## 2024-10-07 ENCOUNTER — OFFICE VISIT (OUTPATIENT)
Dept: OBGYN | Facility: CLINIC | Age: 34
End: 2024-10-07
Payer: COMMERCIAL

## 2024-10-07 VITALS — SYSTOLIC BLOOD PRESSURE: 123 MMHG | DIASTOLIC BLOOD PRESSURE: 62 MMHG | WEIGHT: 230.4 LBS | BODY MASS INDEX: 38.34 KG/M2

## 2024-10-07 DIAGNOSIS — O14.90 PRE-ECLAMPSIA AFFECTING PREGNANCY, ANTEPARTUM: Primary | ICD-10-CM

## 2024-10-07 DIAGNOSIS — R03.0 ELEVATED BLOOD PRESSURE READING: ICD-10-CM

## 2024-10-07 PROCEDURE — 59025 FETAL NON-STRESS TEST: CPT | Performed by: OBSTETRICS & GYNECOLOGY

## 2024-10-07 ASSESSMENT — FIBROSIS 4 INDEX: FIB4 SCORE: 1.21

## 2024-10-09 ENCOUNTER — HOSPITAL ENCOUNTER (OUTPATIENT)
Dept: LAB | Facility: MEDICAL CENTER | Age: 34
End: 2024-10-09
Attending: OBSTETRICS & GYNECOLOGY
Payer: COMMERCIAL

## 2024-10-09 DIAGNOSIS — O14.90 PRE-ECLAMPSIA AFFECTING PREGNANCY, ANTEPARTUM: ICD-10-CM

## 2024-10-09 LAB
ALBUMIN SERPL BCP-MCNC: 3.2 G/DL (ref 3.2–4.9)
ALBUMIN/GLOB SERPL: 1.3 G/DL
ALP SERPL-CCNC: 86 U/L (ref 30–99)
ALT SERPL-CCNC: 12 U/L (ref 2–50)
ANION GAP SERPL CALC-SCNC: 10 MMOL/L (ref 7–16)
AST SERPL-CCNC: 18 U/L (ref 12–45)
BILIRUB SERPL-MCNC: 0.2 MG/DL (ref 0.1–1.5)
BUN SERPL-MCNC: 7 MG/DL (ref 8–22)
CALCIUM ALBUM COR SERPL-MCNC: 9.3 MG/DL (ref 8.5–10.5)
CALCIUM SERPL-MCNC: 8.7 MG/DL (ref 8.5–10.5)
CHLORIDE SERPL-SCNC: 107 MMOL/L (ref 96–112)
CO2 SERPL-SCNC: 20 MMOL/L (ref 20–33)
CREAT SERPL-MCNC: 0.68 MG/DL (ref 0.5–1.4)
ERYTHROCYTE [DISTWIDTH] IN BLOOD BY AUTOMATED COUNT: 45.7 FL (ref 35.9–50)
GFR SERPLBLD CREATININE-BSD FMLA CKD-EPI: 116 ML/MIN/1.73 M 2
GLOBULIN SER CALC-MCNC: 2.5 G/DL (ref 1.9–3.5)
GLUCOSE SERPL-MCNC: 94 MG/DL (ref 65–99)
HCT VFR BLD AUTO: 38 % (ref 37–47)
HGB BLD-MCNC: 13 G/DL (ref 12–16)
MCH RBC QN AUTO: 31.2 PG (ref 27–33)
MCHC RBC AUTO-ENTMCNC: 34.2 G/DL (ref 32.2–35.5)
MCV RBC AUTO: 91.1 FL (ref 81.4–97.8)
PLATELET # BLD AUTO: 155 K/UL (ref 164–446)
PMV BLD AUTO: 12.1 FL (ref 9–12.9)
POTASSIUM SERPL-SCNC: 4.2 MMOL/L (ref 3.6–5.5)
PROT SERPL-MCNC: 5.7 G/DL (ref 6–8.2)
RBC # BLD AUTO: 4.17 M/UL (ref 4.2–5.4)
SODIUM SERPL-SCNC: 137 MMOL/L (ref 135–145)
WBC # BLD AUTO: 11.4 K/UL (ref 4.8–10.8)

## 2024-10-09 PROCEDURE — 85027 COMPLETE CBC AUTOMATED: CPT

## 2024-10-09 PROCEDURE — 36415 COLL VENOUS BLD VENIPUNCTURE: CPT

## 2024-10-09 PROCEDURE — 80053 COMPREHEN METABOLIC PANEL: CPT

## 2024-10-14 ENCOUNTER — ROUTINE PRENATAL (OUTPATIENT)
Dept: OBGYN | Facility: CLINIC | Age: 34
End: 2024-10-14
Payer: COMMERCIAL

## 2024-10-14 ENCOUNTER — NON-PROVIDER VISIT (OUTPATIENT)
Dept: OBGYN | Facility: CLINIC | Age: 34
End: 2024-10-14
Payer: COMMERCIAL

## 2024-10-14 VITALS — SYSTOLIC BLOOD PRESSURE: 122 MMHG | WEIGHT: 229.4 LBS | BODY MASS INDEX: 38.17 KG/M2 | DIASTOLIC BLOOD PRESSURE: 62 MMHG

## 2024-10-14 DIAGNOSIS — O14.93 PRE-ECLAMPSIA IN THIRD TRIMESTER: ICD-10-CM

## 2024-10-14 DIAGNOSIS — O09.523 MULTIGRAVIDA OF ADVANCED MATERNAL AGE IN THIRD TRIMESTER: ICD-10-CM

## 2024-10-14 PROCEDURE — 59025 FETAL NON-STRESS TEST: CPT | Performed by: STUDENT IN AN ORGANIZED HEALTH CARE EDUCATION/TRAINING PROGRAM

## 2024-10-14 PROCEDURE — 0502F SUBSEQUENT PRENATAL CARE: CPT | Performed by: STUDENT IN AN ORGANIZED HEALTH CARE EDUCATION/TRAINING PROGRAM

## 2024-10-14 ASSESSMENT — FIBROSIS 4 INDEX: FIB4 SCORE: 1.17

## 2024-10-15 ENCOUNTER — HOSPITAL ENCOUNTER (OUTPATIENT)
Dept: LAB | Facility: MEDICAL CENTER | Age: 34
End: 2024-10-15
Attending: OBSTETRICS & GYNECOLOGY
Payer: COMMERCIAL

## 2024-10-15 DIAGNOSIS — O14.90 PRE-ECLAMPSIA AFFECTING PREGNANCY, ANTEPARTUM: ICD-10-CM

## 2024-10-15 LAB
ALBUMIN SERPL BCP-MCNC: 3.5 G/DL (ref 3.2–4.9)
ALBUMIN/GLOB SERPL: 1.5 G/DL
ALP SERPL-CCNC: 91 U/L (ref 30–99)
ALT SERPL-CCNC: 11 U/L (ref 2–50)
ANION GAP SERPL CALC-SCNC: 16 MMOL/L (ref 7–16)
AST SERPL-CCNC: 16 U/L (ref 12–45)
BILIRUB SERPL-MCNC: 0.2 MG/DL (ref 0.1–1.5)
BUN SERPL-MCNC: 8 MG/DL (ref 8–22)
CALCIUM ALBUM COR SERPL-MCNC: 9.6 MG/DL (ref 8.5–10.5)
CALCIUM SERPL-MCNC: 9.2 MG/DL (ref 8.5–10.5)
CHLORIDE SERPL-SCNC: 103 MMOL/L (ref 96–112)
CO2 SERPL-SCNC: 20 MMOL/L (ref 20–33)
CREAT SERPL-MCNC: 0.69 MG/DL (ref 0.5–1.4)
ERYTHROCYTE [DISTWIDTH] IN BLOOD BY AUTOMATED COUNT: 45.9 FL (ref 35.9–50)
GFR SERPLBLD CREATININE-BSD FMLA CKD-EPI: 116 ML/MIN/1.73 M 2
GLOBULIN SER CALC-MCNC: 2.4 G/DL (ref 1.9–3.5)
GLUCOSE SERPL-MCNC: 92 MG/DL (ref 65–99)
HCT VFR BLD AUTO: 38.3 % (ref 37–47)
HGB BLD-MCNC: 13.1 G/DL (ref 12–16)
MCH RBC QN AUTO: 31.3 PG (ref 27–33)
MCHC RBC AUTO-ENTMCNC: 34.2 G/DL (ref 32.2–35.5)
MCV RBC AUTO: 91.6 FL (ref 81.4–97.8)
PLATELET # BLD AUTO: 147 K/UL (ref 164–446)
PMV BLD AUTO: 12.1 FL (ref 9–12.9)
POTASSIUM SERPL-SCNC: 3.9 MMOL/L (ref 3.6–5.5)
PROT SERPL-MCNC: 5.9 G/DL (ref 6–8.2)
RBC # BLD AUTO: 4.18 M/UL (ref 4.2–5.4)
SODIUM SERPL-SCNC: 139 MMOL/L (ref 135–145)
WBC # BLD AUTO: 12.7 K/UL (ref 4.8–10.8)

## 2024-10-15 PROCEDURE — 36415 COLL VENOUS BLD VENIPUNCTURE: CPT

## 2024-10-15 PROCEDURE — 80053 COMPREHEN METABOLIC PANEL: CPT

## 2024-10-15 PROCEDURE — 85027 COMPLETE CBC AUTOMATED: CPT

## 2024-10-21 ENCOUNTER — NON-PROVIDER VISIT (OUTPATIENT)
Dept: OBGYN | Facility: CLINIC | Age: 34
End: 2024-10-21
Payer: COMMERCIAL

## 2024-10-21 VITALS — WEIGHT: 228 LBS | DIASTOLIC BLOOD PRESSURE: 67 MMHG | BODY MASS INDEX: 37.94 KG/M2 | SYSTOLIC BLOOD PRESSURE: 134 MMHG

## 2024-10-21 DIAGNOSIS — O14.93 PRE-ECLAMPSIA IN THIRD TRIMESTER: ICD-10-CM

## 2024-10-21 PROCEDURE — 59025 FETAL NON-STRESS TEST: CPT | Performed by: STUDENT IN AN ORGANIZED HEALTH CARE EDUCATION/TRAINING PROGRAM

## 2024-10-21 ASSESSMENT — FIBROSIS 4 INDEX: FIB4 SCORE: 1.15

## 2024-10-22 ENCOUNTER — HOSPITAL ENCOUNTER (OUTPATIENT)
Dept: LAB | Facility: MEDICAL CENTER | Age: 34
End: 2024-10-22
Attending: OBSTETRICS & GYNECOLOGY
Payer: COMMERCIAL

## 2024-10-22 DIAGNOSIS — O14.90 PRE-ECLAMPSIA AFFECTING PREGNANCY, ANTEPARTUM: ICD-10-CM

## 2024-10-22 LAB
ALBUMIN SERPL BCP-MCNC: 3.2 G/DL (ref 3.2–4.9)
ALBUMIN/GLOB SERPL: 1.2 G/DL
ALP SERPL-CCNC: 94 U/L (ref 30–99)
ALT SERPL-CCNC: 9 U/L (ref 2–50)
ANION GAP SERPL CALC-SCNC: 13 MMOL/L (ref 7–16)
AST SERPL-CCNC: 11 U/L (ref 12–45)
BILIRUB SERPL-MCNC: 0.2 MG/DL (ref 0.1–1.5)
BUN SERPL-MCNC: 7 MG/DL (ref 8–22)
CALCIUM ALBUM COR SERPL-MCNC: 9.4 MG/DL (ref 8.5–10.5)
CALCIUM SERPL-MCNC: 8.8 MG/DL (ref 8.5–10.5)
CHLORIDE SERPL-SCNC: 103 MMOL/L (ref 96–112)
CO2 SERPL-SCNC: 20 MMOL/L (ref 20–33)
CREAT SERPL-MCNC: 0.67 MG/DL (ref 0.5–1.4)
ERYTHROCYTE [DISTWIDTH] IN BLOOD BY AUTOMATED COUNT: 46.3 FL (ref 35.9–50)
GFR SERPLBLD CREATININE-BSD FMLA CKD-EPI: 117 ML/MIN/1.73 M 2
GLOBULIN SER CALC-MCNC: 2.6 G/DL (ref 1.9–3.5)
GLUCOSE SERPL-MCNC: 96 MG/DL (ref 65–99)
HCT VFR BLD AUTO: 38 % (ref 37–47)
HGB BLD-MCNC: 13 G/DL (ref 12–16)
MCH RBC QN AUTO: 31.3 PG (ref 27–33)
MCHC RBC AUTO-ENTMCNC: 34.2 G/DL (ref 32.2–35.5)
MCV RBC AUTO: 91.6 FL (ref 81.4–97.8)
PLATELET # BLD AUTO: 150 K/UL (ref 164–446)
PMV BLD AUTO: 12.1 FL (ref 9–12.9)
POTASSIUM SERPL-SCNC: 3.9 MMOL/L (ref 3.6–5.5)
PROT SERPL-MCNC: 5.8 G/DL (ref 6–8.2)
RBC # BLD AUTO: 4.15 M/UL (ref 4.2–5.4)
SODIUM SERPL-SCNC: 136 MMOL/L (ref 135–145)
WBC # BLD AUTO: 12.1 K/UL (ref 4.8–10.8)

## 2024-10-22 PROCEDURE — 85027 COMPLETE CBC AUTOMATED: CPT

## 2024-10-22 PROCEDURE — 36415 COLL VENOUS BLD VENIPUNCTURE: CPT

## 2024-10-22 PROCEDURE — 80053 COMPREHEN METABOLIC PANEL: CPT

## 2024-10-24 ENCOUNTER — OFFICE VISIT (OUTPATIENT)
Dept: OBGYN | Facility: CLINIC | Age: 34
End: 2024-10-24
Payer: COMMERCIAL

## 2024-10-24 DIAGNOSIS — O14.93 PRE-ECLAMPSIA IN THIRD TRIMESTER: ICD-10-CM

## 2024-10-24 DIAGNOSIS — O09.523 MULTIGRAVIDA OF ADVANCED MATERNAL AGE IN THIRD TRIMESTER: ICD-10-CM

## 2024-10-24 PROCEDURE — 59025 FETAL NON-STRESS TEST: CPT | Performed by: OBSTETRICS & GYNECOLOGY

## 2024-10-28 ENCOUNTER — ROUTINE PRENATAL (OUTPATIENT)
Dept: OBGYN | Facility: CLINIC | Age: 34
End: 2024-10-28
Payer: COMMERCIAL

## 2024-10-28 ENCOUNTER — NON-PROVIDER VISIT (OUTPATIENT)
Dept: OBGYN | Facility: CLINIC | Age: 34
End: 2024-10-28
Payer: COMMERCIAL

## 2024-10-28 VITALS — WEIGHT: 232.2 LBS | SYSTOLIC BLOOD PRESSURE: 113 MMHG | BODY MASS INDEX: 38.64 KG/M2 | DIASTOLIC BLOOD PRESSURE: 59 MMHG

## 2024-10-28 DIAGNOSIS — O09.523 MULTIGRAVIDA OF ADVANCED MATERNAL AGE IN THIRD TRIMESTER: ICD-10-CM

## 2024-10-28 DIAGNOSIS — O26.899 RH NEGATIVE STATE IN ANTEPARTUM PERIOD: ICD-10-CM

## 2024-10-28 DIAGNOSIS — Z67.91 RH NEGATIVE STATE IN ANTEPARTUM PERIOD: ICD-10-CM

## 2024-10-28 DIAGNOSIS — O14.93 PRE-ECLAMPSIA IN THIRD TRIMESTER: ICD-10-CM

## 2024-10-28 PROCEDURE — 0502F SUBSEQUENT PRENATAL CARE: CPT | Performed by: STUDENT IN AN ORGANIZED HEALTH CARE EDUCATION/TRAINING PROGRAM

## 2024-10-28 PROCEDURE — 3078F DIAST BP <80 MM HG: CPT | Performed by: STUDENT IN AN ORGANIZED HEALTH CARE EDUCATION/TRAINING PROGRAM

## 2024-10-28 PROCEDURE — 59025 FETAL NON-STRESS TEST: CPT | Performed by: STUDENT IN AN ORGANIZED HEALTH CARE EDUCATION/TRAINING PROGRAM

## 2024-10-28 PROCEDURE — 3074F SYST BP LT 130 MM HG: CPT | Performed by: STUDENT IN AN ORGANIZED HEALTH CARE EDUCATION/TRAINING PROGRAM

## 2024-10-28 ASSESSMENT — FIBROSIS 4 INDEX: FIB4 SCORE: 0.86

## 2024-10-29 ENCOUNTER — HOSPITAL ENCOUNTER (OUTPATIENT)
Dept: LAB | Facility: MEDICAL CENTER | Age: 34
End: 2024-10-29
Attending: OBSTETRICS & GYNECOLOGY
Payer: COMMERCIAL

## 2024-10-29 DIAGNOSIS — O14.90 PRE-ECLAMPSIA AFFECTING PREGNANCY, ANTEPARTUM: ICD-10-CM

## 2024-10-29 LAB
ALBUMIN SERPL BCP-MCNC: 3.1 G/DL (ref 3.2–4.9)
ALBUMIN/GLOB SERPL: 1.1 G/DL
ALP SERPL-CCNC: 97 U/L (ref 30–99)
ALT SERPL-CCNC: 10 U/L (ref 2–50)
ANION GAP SERPL CALC-SCNC: 13 MMOL/L (ref 7–16)
AST SERPL-CCNC: 17 U/L (ref 12–45)
BILIRUB SERPL-MCNC: 0.2 MG/DL (ref 0.1–1.5)
BUN SERPL-MCNC: 9 MG/DL (ref 8–22)
CALCIUM ALBUM COR SERPL-MCNC: 9.5 MG/DL (ref 8.5–10.5)
CALCIUM SERPL-MCNC: 8.8 MG/DL (ref 8.5–10.5)
CHLORIDE SERPL-SCNC: 103 MMOL/L (ref 96–112)
CO2 SERPL-SCNC: 19 MMOL/L (ref 20–33)
CREAT SERPL-MCNC: 0.62 MG/DL (ref 0.5–1.4)
ERYTHROCYTE [DISTWIDTH] IN BLOOD BY AUTOMATED COUNT: 47.4 FL (ref 35.9–50)
GFR SERPLBLD CREATININE-BSD FMLA CKD-EPI: 119 ML/MIN/1.73 M 2
GLOBULIN SER CALC-MCNC: 2.7 G/DL (ref 1.9–3.5)
GLUCOSE SERPL-MCNC: 96 MG/DL (ref 65–99)
HCT VFR BLD AUTO: 37.4 % (ref 37–47)
HGB BLD-MCNC: 12.5 G/DL (ref 12–16)
MCH RBC QN AUTO: 30.9 PG (ref 27–33)
MCHC RBC AUTO-ENTMCNC: 33.4 G/DL (ref 32.2–35.5)
MCV RBC AUTO: 92.6 FL (ref 81.4–97.8)
PLATELET # BLD AUTO: 152 K/UL (ref 164–446)
PMV BLD AUTO: 12.2 FL (ref 9–12.9)
POTASSIUM SERPL-SCNC: 4 MMOL/L (ref 3.6–5.5)
PROT SERPL-MCNC: 5.8 G/DL (ref 6–8.2)
RBC # BLD AUTO: 4.04 M/UL (ref 4.2–5.4)
SODIUM SERPL-SCNC: 135 MMOL/L (ref 135–145)
WBC # BLD AUTO: 11.4 K/UL (ref 4.8–10.8)

## 2024-10-29 PROCEDURE — 36415 COLL VENOUS BLD VENIPUNCTURE: CPT

## 2024-10-29 PROCEDURE — 85027 COMPLETE CBC AUTOMATED: CPT

## 2024-10-29 PROCEDURE — 80053 COMPREHEN METABOLIC PANEL: CPT

## 2024-10-31 ENCOUNTER — OFFICE VISIT (OUTPATIENT)
Dept: OBGYN | Facility: CLINIC | Age: 34
End: 2024-10-31
Payer: COMMERCIAL

## 2024-10-31 VITALS — BODY MASS INDEX: 38.72 KG/M2 | WEIGHT: 232.7 LBS | SYSTOLIC BLOOD PRESSURE: 110 MMHG | DIASTOLIC BLOOD PRESSURE: 55 MMHG

## 2024-10-31 DIAGNOSIS — O09.523 MULTIGRAVIDA OF ADVANCED MATERNAL AGE IN THIRD TRIMESTER: ICD-10-CM

## 2024-10-31 DIAGNOSIS — O14.93 PRE-ECLAMPSIA IN THIRD TRIMESTER: ICD-10-CM

## 2024-10-31 ASSESSMENT — FIBROSIS 4 INDEX: FIB4 SCORE: 1.24

## 2024-11-04 ENCOUNTER — HOSPITAL ENCOUNTER (OUTPATIENT)
Dept: RADIOLOGY | Facility: MEDICAL CENTER | Age: 34
End: 2024-11-04
Attending: STUDENT IN AN ORGANIZED HEALTH CARE EDUCATION/TRAINING PROGRAM
Payer: COMMERCIAL

## 2024-11-04 DIAGNOSIS — O14.93 PRE-ECLAMPSIA IN THIRD TRIMESTER: ICD-10-CM

## 2024-11-04 PROCEDURE — 76816 OB US FOLLOW-UP PER FETUS: CPT

## 2024-11-05 ENCOUNTER — HOSPITAL ENCOUNTER (OUTPATIENT)
Facility: MEDICAL CENTER | Age: 34
End: 2024-11-05
Attending: OBSTETRICS & GYNECOLOGY
Payer: COMMERCIAL

## 2024-11-05 ENCOUNTER — APPOINTMENT (OUTPATIENT)
Dept: OBGYN | Facility: CLINIC | Age: 34
End: 2024-11-05
Payer: COMMERCIAL

## 2024-11-05 ENCOUNTER — ROUTINE PRENATAL (OUTPATIENT)
Dept: OBGYN | Facility: CLINIC | Age: 34
End: 2024-11-05
Payer: COMMERCIAL

## 2024-11-05 ENCOUNTER — OFFICE VISIT (OUTPATIENT)
Dept: OBGYN | Facility: CLINIC | Age: 34
End: 2024-11-05
Payer: COMMERCIAL

## 2024-11-05 VITALS — WEIGHT: 231 LBS | BODY MASS INDEX: 38.44 KG/M2 | DIASTOLIC BLOOD PRESSURE: 55 MMHG | SYSTOLIC BLOOD PRESSURE: 116 MMHG

## 2024-11-05 DIAGNOSIS — O09.523 MULTIGRAVIDA OF ADVANCED MATERNAL AGE IN THIRD TRIMESTER: ICD-10-CM

## 2024-11-05 DIAGNOSIS — O14.93 PRE-ECLAMPSIA IN THIRD TRIMESTER: ICD-10-CM

## 2024-11-05 PROCEDURE — 87150 DNA/RNA AMPLIFIED PROBE: CPT

## 2024-11-05 PROCEDURE — 87081 CULTURE SCREEN ONLY: CPT

## 2024-11-05 PROCEDURE — 99999 PR NO CHARGE: CPT | Performed by: OBSTETRICS & GYNECOLOGY

## 2024-11-05 ASSESSMENT — FIBROSIS 4 INDEX: FIB4 SCORE: 1.24

## 2024-11-05 NOTE — PROGRESS NOTES
Pt here today for NST  Pt requests serial BP during NST  Reports +FM,  denies LOF + VB   Good # [phone]  Pharmacy Confirmed.    BP:   116/55, 112/58, 116/58

## 2024-11-06 LAB — GP B STREP DNA SPEC QL NAA+PROBE: NEGATIVE

## 2024-11-07 ENCOUNTER — HOSPITAL ENCOUNTER (OUTPATIENT)
Dept: LAB | Facility: MEDICAL CENTER | Age: 34
End: 2024-11-07
Attending: OBSTETRICS & GYNECOLOGY
Payer: COMMERCIAL

## 2024-11-07 DIAGNOSIS — O14.90 PRE-ECLAMPSIA AFFECTING PREGNANCY, ANTEPARTUM: ICD-10-CM

## 2024-11-07 LAB
ALBUMIN SERPL BCP-MCNC: 3.3 G/DL (ref 3.2–4.9)
ALBUMIN/GLOB SERPL: 1.3 G/DL
ALP SERPL-CCNC: 109 U/L (ref 30–99)
ALT SERPL-CCNC: 10 U/L (ref 2–50)
ANION GAP SERPL CALC-SCNC: 9 MMOL/L (ref 7–16)
AST SERPL-CCNC: 16 U/L (ref 12–45)
BILIRUB SERPL-MCNC: <0.2 MG/DL (ref 0.1–1.5)
BUN SERPL-MCNC: 8 MG/DL (ref 8–22)
CALCIUM ALBUM COR SERPL-MCNC: 9.1 MG/DL (ref 8.5–10.5)
CALCIUM SERPL-MCNC: 8.5 MG/DL (ref 8.5–10.5)
CHLORIDE SERPL-SCNC: 108 MMOL/L (ref 96–112)
CO2 SERPL-SCNC: 20 MMOL/L (ref 20–33)
CREAT SERPL-MCNC: 0.7 MG/DL (ref 0.5–1.4)
ERYTHROCYTE [DISTWIDTH] IN BLOOD BY AUTOMATED COUNT: 48.6 FL (ref 35.9–50)
GFR SERPLBLD CREATININE-BSD FMLA CKD-EPI: 115 ML/MIN/1.73 M 2
GLOBULIN SER CALC-MCNC: 2.6 G/DL (ref 1.9–3.5)
GLUCOSE SERPL-MCNC: 95 MG/DL (ref 65–99)
HCT VFR BLD AUTO: 39.3 % (ref 37–47)
HGB BLD-MCNC: 13 G/DL (ref 12–16)
MCH RBC QN AUTO: 31.3 PG (ref 27–33)
MCHC RBC AUTO-ENTMCNC: 33.1 G/DL (ref 32.2–35.5)
MCV RBC AUTO: 94.5 FL (ref 81.4–97.8)
PLATELET # BLD AUTO: 151 K/UL (ref 164–446)
PMV BLD AUTO: 11.9 FL (ref 9–12.9)
POTASSIUM SERPL-SCNC: 4.1 MMOL/L (ref 3.6–5.5)
PROT SERPL-MCNC: 5.9 G/DL (ref 6–8.2)
RBC # BLD AUTO: 4.16 M/UL (ref 4.2–5.4)
SODIUM SERPL-SCNC: 137 MMOL/L (ref 135–145)
WBC # BLD AUTO: 12.7 K/UL (ref 4.8–10.8)

## 2024-11-07 PROCEDURE — 85027 COMPLETE CBC AUTOMATED: CPT

## 2024-11-07 PROCEDURE — 80053 COMPREHEN METABOLIC PANEL: CPT

## 2024-11-07 PROCEDURE — 36415 COLL VENOUS BLD VENIPUNCTURE: CPT

## 2024-11-08 ENCOUNTER — NON-PROVIDER VISIT (OUTPATIENT)
Dept: OBGYN | Facility: CLINIC | Age: 34
End: 2024-11-08
Payer: COMMERCIAL

## 2024-11-08 VITALS
WEIGHT: 231 LBS | DIASTOLIC BLOOD PRESSURE: 60 MMHG | SYSTOLIC BLOOD PRESSURE: 117 MMHG | BODY MASS INDEX: 38.44 KG/M2 | HEART RATE: 87 BPM

## 2024-11-08 DIAGNOSIS — O14.93 PRE-ECLAMPSIA IN THIRD TRIMESTER: ICD-10-CM

## 2024-11-08 ASSESSMENT — FIBROSIS 4 INDEX: FIB4 SCORE: 1.17

## 2024-11-08 NOTE — PROGRESS NOTES
Jami Gregory   Gestational age: 36w4d     Indications: AMA, pre-e without sv features, labs yesterday, managed   /60, 107/62  Baseline 150, reactive, Variability  6-25 BPM, Accelerations: Yes, Decelerations: No      IOL 11/11    Tessa Olivo P.A.-C.  [facility]       I reviewed the case with Dr Thurman who consulted and agrees with the plan.

## 2024-11-08 NOTE — PROGRESS NOTES
Pt here today for NST  Pt states  Reports +FM, denies LOF, VB  Good # [phone]  Pharmacy Confirmed.

## 2024-11-09 NOTE — RESULT ENCOUNTER NOTE
Harley Gregory    Your growth US was normal. Please don't hesitate to reach out if you have any questions.      Jami Olmos DO, FACOG  [facility]

## 2024-11-12 ENCOUNTER — HOSPITAL ENCOUNTER (INPATIENT)
Facility: MEDICAL CENTER | Age: 34
LOS: 1 days | DRG: 833 | End: 2024-11-12
Attending: OBSTETRICS & GYNECOLOGY | Admitting: OBSTETRICS & GYNECOLOGY
Payer: COMMERCIAL

## 2024-11-12 VITALS
RESPIRATION RATE: 16 BRPM | DIASTOLIC BLOOD PRESSURE: 68 MMHG | WEIGHT: 231 LBS | HEIGHT: 65 IN | HEART RATE: 104 BPM | OXYGEN SATURATION: 100 % | BODY MASS INDEX: 38.49 KG/M2 | SYSTOLIC BLOOD PRESSURE: 109 MMHG | TEMPERATURE: 97.6 F

## 2024-11-12 LAB
ALBUMIN SERPL BCP-MCNC: 3 G/DL (ref 3.2–4.9)
ALBUMIN/GLOB SERPL: 1.1 G/DL
ALP SERPL-CCNC: 105 U/L (ref 30–99)
ALT SERPL-CCNC: 9 U/L (ref 2–50)
ANION GAP SERPL CALC-SCNC: 13 MMOL/L (ref 7–16)
AST SERPL-CCNC: 22 U/L (ref 12–45)
BASOPHILS # BLD AUTO: 0.2 % (ref 0–1.8)
BASOPHILS # BLD: 0.02 K/UL (ref 0–0.12)
BILIRUB SERPL-MCNC: 0.2 MG/DL (ref 0.1–1.5)
BUN SERPL-MCNC: 11 MG/DL (ref 8–22)
CALCIUM ALBUM COR SERPL-MCNC: 9.9 MG/DL (ref 8.5–10.5)
CALCIUM SERPL-MCNC: 9.1 MG/DL (ref 8.5–10.5)
CHLORIDE SERPL-SCNC: 106 MMOL/L (ref 96–112)
CO2 SERPL-SCNC: 18 MMOL/L (ref 20–33)
CREAT SERPL-MCNC: 0.65 MG/DL (ref 0.5–1.4)
CREAT UR-MCNC: 81.2 MG/DL
EOSINOPHIL # BLD AUTO: 0.07 K/UL (ref 0–0.51)
EOSINOPHIL NFR BLD: 0.6 % (ref 0–6.9)
ERYTHROCYTE [DISTWIDTH] IN BLOOD BY AUTOMATED COUNT: 45.2 FL (ref 35.9–50)
GFR SERPLBLD CREATININE-BSD FMLA CKD-EPI: 117 ML/MIN/1.73 M 2
GLOBULIN SER CALC-MCNC: 2.7 G/DL (ref 1.9–3.5)
GLUCOSE SERPL-MCNC: 92 MG/DL (ref 65–99)
HCT VFR BLD AUTO: 35.9 % (ref 37–47)
HGB BLD-MCNC: 12.7 G/DL (ref 12–16)
HOLDING TUBE BB 8507: NORMAL
IMM GRANULOCYTES # BLD AUTO: 0.14 K/UL (ref 0–0.11)
IMM GRANULOCYTES NFR BLD AUTO: 1.1 % (ref 0–0.9)
LYMPHOCYTES # BLD AUTO: 2.78 K/UL (ref 1–4.8)
LYMPHOCYTES NFR BLD: 22.3 % (ref 22–41)
MCH RBC QN AUTO: 31.5 PG (ref 27–33)
MCHC RBC AUTO-ENTMCNC: 35.4 G/DL (ref 32.2–35.5)
MCV RBC AUTO: 89.1 FL (ref 81.4–97.8)
MONOCYTES # BLD AUTO: 0.72 K/UL (ref 0–0.85)
MONOCYTES NFR BLD AUTO: 5.8 % (ref 0–13.4)
NEUTROPHILS # BLD AUTO: 8.72 K/UL (ref 1.82–7.42)
NEUTROPHILS NFR BLD: 70 % (ref 44–72)
NRBC # BLD AUTO: 0 K/UL
NRBC BLD-RTO: 0 /100 WBC (ref 0–0.2)
PLATELET # BLD AUTO: 139 K/UL (ref 164–446)
PMV BLD AUTO: 11.7 FL (ref 9–12.9)
POTASSIUM SERPL-SCNC: 3.7 MMOL/L (ref 3.6–5.5)
PROT SERPL-MCNC: 5.7 G/DL (ref 6–8.2)
PROT UR-MCNC: 7 MG/DL (ref 0–15)
PROT/CREAT UR: 86 MG/G (ref 10–107)
RBC # BLD AUTO: 4.03 M/UL (ref 4.2–5.4)
SODIUM SERPL-SCNC: 137 MMOL/L (ref 135–145)
T PALLIDUM AB SER QL IA: NORMAL
WBC # BLD AUTO: 12.5 K/UL (ref 4.8–10.8)

## 2024-11-12 PROCEDURE — 700102 HCHG RX REV CODE 250 W/ 637 OVERRIDE(OP): Performed by: MIDWIFE

## 2024-11-12 PROCEDURE — 80053 COMPREHEN METABOLIC PANEL: CPT

## 2024-11-12 PROCEDURE — 770002 HCHG ROOM/CARE - OB PRIVATE (112)

## 2024-11-12 PROCEDURE — 82570 ASSAY OF URINE CREATININE: CPT

## 2024-11-12 PROCEDURE — 700102 HCHG RX REV CODE 250 W/ 637 OVERRIDE(OP): Performed by: STUDENT IN AN ORGANIZED HEALTH CARE EDUCATION/TRAINING PROGRAM

## 2024-11-12 PROCEDURE — A9270 NON-COVERED ITEM OR SERVICE: HCPCS | Performed by: OBSTETRICS & GYNECOLOGY

## 2024-11-12 PROCEDURE — 84156 ASSAY OF PROTEIN URINE: CPT

## 2024-11-12 PROCEDURE — 4A1HXCZ MONITORING OF PRODUCTS OF CONCEPTION, CARDIAC RATE, EXTERNAL APPROACH: ICD-10-PCS | Performed by: OBSTETRICS & GYNECOLOGY

## 2024-11-12 PROCEDURE — A9270 NON-COVERED ITEM OR SERVICE: HCPCS | Performed by: MIDWIFE

## 2024-11-12 PROCEDURE — 86780 TREPONEMA PALLIDUM: CPT

## 2024-11-12 PROCEDURE — 700102 HCHG RX REV CODE 250 W/ 637 OVERRIDE(OP): Performed by: OBSTETRICS & GYNECOLOGY

## 2024-11-12 PROCEDURE — 85025 COMPLETE CBC W/AUTO DIFF WBC: CPT

## 2024-11-12 PROCEDURE — 36415 COLL VENOUS BLD VENIPUNCTURE: CPT

## 2024-11-12 PROCEDURE — A9270 NON-COVERED ITEM OR SERVICE: HCPCS | Performed by: STUDENT IN AN ORGANIZED HEALTH CARE EDUCATION/TRAINING PROGRAM

## 2024-11-12 RX ORDER — ACETAMINOPHEN 500 MG
1000 TABLET ORAL
Status: DISCONTINUED | OUTPATIENT
Start: 2024-11-12 | End: 2024-11-12 | Stop reason: HOSPADM

## 2024-11-12 RX ORDER — SODIUM CHLORIDE, SODIUM LACTATE, POTASSIUM CHLORIDE, CALCIUM CHLORIDE 600; 310; 30; 20 MG/100ML; MG/100ML; MG/100ML; MG/100ML
INJECTION, SOLUTION INTRAVENOUS CONTINUOUS
Status: DISCONTINUED | OUTPATIENT
Start: 2024-11-12 | End: 2024-11-12 | Stop reason: HOSPADM

## 2024-11-12 RX ORDER — OXYTOCIN 10 [USP'U]/ML
10 INJECTION, SOLUTION INTRAMUSCULAR; INTRAVENOUS
Status: DISCONTINUED | OUTPATIENT
Start: 2024-11-12 | End: 2024-11-12 | Stop reason: HOSPADM

## 2024-11-12 RX ORDER — CALCIUM CARBONATE 500 MG/1
500 TABLET, CHEWABLE ORAL DAILY
Status: DISCONTINUED | OUTPATIENT
Start: 2024-11-12 | End: 2024-11-12 | Stop reason: HOSPADM

## 2024-11-12 RX ORDER — TERBUTALINE SULFATE 1 MG/ML
0.25 INJECTION, SOLUTION SUBCUTANEOUS
Status: DISCONTINUED | OUTPATIENT
Start: 2024-11-12 | End: 2024-11-12 | Stop reason: HOSPADM

## 2024-11-12 RX ORDER — LIDOCAINE HYDROCHLORIDE 10 MG/ML
20 INJECTION, SOLUTION INFILTRATION; PERINEURAL
Status: DISCONTINUED | OUTPATIENT
Start: 2024-11-12 | End: 2024-11-12 | Stop reason: HOSPADM

## 2024-11-12 RX ORDER — IBUPROFEN 800 MG/1
800 TABLET, FILM COATED ORAL
Status: DISCONTINUED | OUTPATIENT
Start: 2024-11-12 | End: 2024-11-12 | Stop reason: HOSPADM

## 2024-11-12 RX ADMIN — ANTACID TABLETS 500 MG: 500 TABLET, CHEWABLE ORAL at 04:29

## 2024-11-12 RX ADMIN — MISOPROSTOL 25 MCG: 100 TABLET ORAL at 04:22

## 2024-11-12 RX ADMIN — MISOPROSTOL 25 MCG: 100 TABLET ORAL at 09:31

## 2024-11-12 SDOH — ECONOMIC STABILITY: TRANSPORTATION INSECURITY
IN THE PAST 12 MONTHS, HAS THE LACK OF TRANSPORTATION KEPT YOU FROM MEDICAL APPOINTMENTS OR FROM GETTING MEDICATIONS?: NO

## 2024-11-12 SDOH — ECONOMIC STABILITY: TRANSPORTATION INSECURITY
IN THE PAST 12 MONTHS, HAS LACK OF RELIABLE TRANSPORTATION KEPT YOU FROM MEDICAL APPOINTMENTS, MEETINGS, WORK OR FROM GETTING THINGS NEEDED FOR DAILY LIVING?: NO

## 2024-11-12 ASSESSMENT — PAIN DESCRIPTION - PAIN TYPE
TYPE: ACUTE PAIN

## 2024-11-12 ASSESSMENT — LIFESTYLE VARIABLES
TOTAL SCORE: 0
HAVE YOU EVER FELT YOU SHOULD CUT DOWN ON YOUR DRINKING: NO
CONSUMPTION TOTAL: INCOMPLETE
EVER HAD A DRINK FIRST THING IN THE MORNING TO STEADY YOUR NERVES TO GET RID OF A HANGOVER: NO
ALCOHOL_USE: NO
TOTAL SCORE: 0
TOTAL SCORE: 0
HAVE PEOPLE ANNOYED YOU BY CRITICIZING YOUR DRINKING: NO
DOES PATIENT WANT TO STOP DRINKING: NO
EVER FELT BAD OR GUILTY ABOUT YOUR DRINKING: NO

## 2024-11-12 ASSESSMENT — SOCIAL DETERMINANTS OF HEALTH (SDOH)
WITHIN THE LAST YEAR, HAVE TO BEEN RAPED OR FORCED TO HAVE ANY KIND OF SEXUAL ACTIVITY BY YOUR PARTNER OR EX-PARTNER?: NO
WITHIN THE LAST YEAR, HAVE YOU BEEN AFRAID OF YOUR PARTNER OR EX-PARTNER?: NO
WITHIN THE PAST 12 MONTHS, YOU WORRIED THAT YOUR FOOD WOULD RUN OUT BEFORE YOU GOT THE MONEY TO BUY MORE: NEVER TRUE
IN THE PAST 12 MONTHS, HAS THE ELECTRIC, GAS, OIL, OR WATER COMPANY THREATENED TO SHUT OFF SERVICE IN YOUR HOME?: NO
WITHIN THE PAST 12 MONTHS, THE FOOD YOU BOUGHT JUST DIDN'T LAST AND YOU DIDN'T HAVE MONEY TO GET MORE: NEVER TRUE
WITHIN THE LAST YEAR, HAVE YOU BEEN KICKED, HIT, SLAPPED, OR OTHERWISE PHYSICALLY HURT BY YOUR PARTNER OR EX-PARTNER?: NO
WITHIN THE LAST YEAR, HAVE YOU BEEN HUMILIATED OR EMOTIONALLY ABUSED IN OTHER WAYS BY YOUR PARTNER OR EX-PARTNER?: NO

## 2024-11-12 ASSESSMENT — PAIN SCALES - GENERAL: PAINLEVEL: 0 - NO PAIN

## 2024-11-12 ASSESSMENT — PATIENT HEALTH QUESTIONNAIRE - PHQ9
SUM OF ALL RESPONSES TO PHQ9 QUESTIONS 1 AND 2: 0
1. LITTLE INTEREST OR PLEASURE IN DOING THINGS: NOT AT ALL
2. FEELING DOWN, DEPRESSED, IRRITABLE, OR HOPELESS: NOT AT ALL

## 2024-11-12 ASSESSMENT — FIBROSIS 4 INDEX: FIB4 SCORE: 1.17

## 2024-11-12 NOTE — H&P
Admission History and Physical      Jami Gregory is a 34 y.o. female  at 37w1d who presents for IOL for pre-eclampsia without severe features. She denies uterine contractions, leaking of fluid and vaginal bleeding. Good fetal movement. Denies HA,visual changes and epigastric pain.    She desires epidural for pain management.    Pregnancy has been complicated by AMA, Rh neg, possible CHTN with 1st elevated BP at 11 wks. Pt reports normal blood pressures at home throughout pregnancy.     ROS: Pertinent items are noted in HPI.      History reviewed. No pertinent past medical history.  History reviewed. No pertinent surgical history.  OB History    Para Term  AB Living   1             SAB IAB Ectopic Molar Multiple Live Births                    # Outcome Date GA Lbr Altaf/2nd Weight Sex Type Anes PTL Lv   1 Current              Social History     Socioeconomic History    Marital status: Single     Spouse name: Not on file    Number of children: Not on file    Years of education: Not on file    Highest education level: Not on file   Occupational History    Not on file   Tobacco Use    Smoking status: Never    Smokeless tobacco: Never   Vaping Use    Vaping status: Never Used   Substance and Sexual Activity    Alcohol use: Never    Drug use: Never    Sexual activity: Yes     Partners: Male     Birth control/protection: None   Other Topics Concern    Not on file   Social History Narrative    Not on file     Social Drivers of Health     Financial Resource Strain: Not on file   Food Insecurity: No Food Insecurity (2024)    Hunger Vital Sign     Worried About Running Out of Food in the Last Year: Never true     Ran Out of Food in the Last Year: Never true   Transportation Needs: No Transportation Needs (2024)    PRAPARE - Transportation     Lack of Transportation (Medical): No     Lack of Transportation (Non-Medical): No   Physical Activity: Not on file   Stress: Not on file   Social  Connections: Not on file   Intimate Partner Violence: Not At Risk (11/12/2024)    Humiliation, Afraid, Rape, and Kick questionnaire     Fear of Current or Ex-Partner: No     Emotionally Abused: No     Physically Abused: No     Sexually Abused: No   Housing Stability: Unknown (11/12/2024)    Housing Stability Vital Sign     Unable to Pay for Housing in the Last Year: No     Number of Times Moved in the Last Year: Not on file     Homeless in the Last Year: No     Allergies: Patient has no known allergies.    Current Facility-Administered Medications:     lidocaine (XYLOCAINE) 1%  injection, 20 mL, Subcutaneous, Once PRN, Jake De Anda M.D.    terbutaline (Brethine) injection 0.25 mg, 0.25 mg, Subcutaneous, Once PRN, Jake De Anda M.D.    oxytocin (Pitocin) injection 10 Units, 10 Units, Intramuscular, Once PRN, Jake De Anda M.D.    oxytocin (Pitocin) infusion bolus (for post delivery), 1-10 Units, Intravenous, Once **FOLLOWED BY** oxytocin (Pitocin) infusion bolus (for post delivery), 10 Units, Intravenous, Once **FOLLOWED BY** oxytocin (Pitocin) infusion (for post delivery), 125 mL/hr, Intravenous, Continuous, Jake De Anda M.D.    ibuprofen (Motrin) tablet 800 mg, 800 mg, Oral, Once PRN, Jake De Anda M.D.    acetaminophen (Tylenol) tablet 1,000 mg, 1,000 mg, Oral, Once PRN, Jkae De Anda M.D.    Rho D Immune Globulin (RHOPHYLAC) IV, 300 mcg, Intravenous, Once **OR** Rho D Immune Globulin (RHOPHYLAC) IM, 300 mcg, Intramuscular, Once, Jake De Anda M.D.    LR infusion, , Intravenous, Continuous, Jake De Anda M.D.    miSOPROStol (Cytotec) tablet 25 mcg, 25 mcg, Vaginal, Once, Jake De Anda M.D.    calcium carbonate (Tums) chewable tab 500 mg, 500 mg, Oral, DAILY, ALICIA Castellanos.[email or web address]    Prenatal care with [facility] starting at 11 wks with following problems:  Patient Active Problem List    Diagnosis Date Noted    Labor and delivery, indication for care 11/12/2024    Pre-eclampsia without SF 10/01/2024    Rh negative state in antepartum  "period 09/18/2024    Elevated blood pressure reading 05/17/2024    Multigravida of advanced maternal age in third trimester 05/17/2024       Last US 11/4/24    FINDINGS:  Fetal Lie:  Vertex  LMP:  2/26/2024  Clinical DAVID by LMP:  12/2/2024     Placenta (Location):  Anterior  Placenta Previa: No  Placental rdGrdrrdarddrderd:rd rd3rd Amniotic Fluid Volume:  CARINA = 12.9 cm     Fetal Heart Rate:  175 bpm     Cervical Length:  Not seen     Fetal Biometry  BPD    8.76 cm, 35 weeks 3 days, (40%)  HC    32.32 cm, 36 weeks 4 days, (32%)  AC    32.66 cm, 36 weeks 4 days, (76%)  Femur Length    7.4 cm, 37 weeks 5 days, (85%)  Humerus Length    6.18 cm, 35 weeks 6 days, (73%)     EGA by this US:  36 weeks 3 days  DAVID by this US: 11/29/2024  DAVID by 1st US:  12/2/2024     Estimated Fetal Weight:  3010 g  EFW Percentile: 71%     Objective:      /84   Pulse 98 Comment: 98 on palpation  Temp 36.6 °C (97.8 °F) (Temporal)   Resp 19   Ht 1.651 m (5' 5\")   Wt 105 kg (231 lb)   LMP 02/26/2024   SpO2 98%   BMI 38.44 kg/m²     General:   no acute distress   Skin:   normal   HEENT:  PERRLA   Lungs:   CTA bilateral   Heart:   regular rate and rhythm   Abdomen:   gravid, NT   EFW:  3100g   Pelvis:  adequate   FHT:  145 BPM   Uterine Size: S=D   Presentations: Cephalic   Cervix:     Dilation: 0    Effacement: Long    Station:  -3    Consistency: Medium    Position: Posterior     Lab Review  Lab:   Blood type: O     Recent Results (from the past 35 weeks)   THINPREP PAP W/HPV AND CTNG    Collection Time: 05/17/24 11:38 AM   Result Value Ref Range    DIAGNOSIS: SEE BELOW     Specimen adequacy: SEE BELOW     Performed by: SEE BELOW     QC reviewed by: SEE BELOW     Comment Comment     Note: SEE BELOW     Test Methodology: SEE BELOW     HPV Aptima Negative Negative    Chlamydia, Nuc. Acid Amp Negative Negative    Gonococcus, Nuc. Acid Amp Negative Negative   URINE DRUG SCREEN W/CONF (AR)    Collection Time: 05/23/24  3:14 PM   Result Value Ref " Range    Urine Amphetamine-Methamphetam Negative Cutoff 300 ng/mL    Barbiturates Negative Cutoff 200 ng/mL    Benzodiazepines Negative Cutoff 200 ng/mL    Propoxyphene Negative Cutoff 300 ng/mL    Cocaine Metabolite Negative Cutoff 150 ng/mL    Methadone Negative Cutoff 150 ng/mL    Codeine-Morphine Negative Cutoff 300 ng/mL    Phencyclidine -Pcp Negative Cutoff 25 ng/mL    Cannabinoid Metab Negative Cutoff 50 ng/mL    Creatinine Urine 135.9 20.0 - 400.0 mg/dL    Drug Comment Urine Drugs See Note    CARRIER SCREEN (3 DISORDERS W/RFLX)    Collection Time: 05/23/24  3:16 PM   Result Value Ref Range    Cystic Fibrosis, Allele 1 Negative     Cystic Fibrosis, Allele 2 Negative     Cystic Fibrosis, 5T Variant Not Applicable     Cystic Fibrosis, 165 Variants, Interp 0 variants     Fragile X Allele 1 30 CGG repeats    Fragile X Allele 20 CGG repeats    Methylation Pattern Not Applicable     Fragile X Interp See Note     Specimen, SMA Copy Number Whole Blood     Symptoms, SMA Copy Number Unknown     SMN1 Copies, SMA Copy Number 2 copies     SMN2 Copies, SMA Copy Number 1 copy     Linked Variant, SMA Copy Number Not Present     Int, SMA Copy Number See Note    HEMOGLOBIN A1C    Collection Time: 05/23/24  3:16 PM   Result Value Ref Range    Glycohemoglobin 5.1 4.0 - 5.6 %    Est Avg Glucose 100 mg/dL   PREG CNTR PRENATAL PN    Collection Time: 05/23/24  3:16 PM   Result Value Ref Range    WBC 11.2 (H) 4.8 - 10.8 K/uL    RBC 4.60 4.20 - 5.40 M/uL    Hemoglobin 14.2 12.0 - 16.0 g/dL    Hematocrit 40.9 37.0 - 47.0 %    MCV 88.9 81.4 - 97.8 fL    MCH 30.9 27.0 - 33.0 pg    MCHC 34.7 32.2 - 35.5 g/dL    RDW 41.4 35.9 - 50.0 fL    Platelet Count 170 164 - 446 K/uL    MPV 12.3 9.0 - 12.9 fL    Hepatitis C Antibody Non-Reactive Non-Reactive    Hepatitis B Surface Antigen Non-Reactive Non-Reactive    Rubella IgG Antibody 78.70 IU/mL    Syphilis, Treponemal Qual Non-Reactive Non-Reactive   URINE CULTURE(NEW)    Collection Time:  05/23/24  3:16 PM    Specimen: Urine   Result Value Ref Range    Significant Indicator POS (POS)     Source UR     Site Clean Catch     Culture Result Usual skin symone 10-50,000 cfu/mL (A)     Culture Result Escherichia coli  >100,000 cfu/mL   (A)        Susceptibility    Escherichia coli - KRISTIN     Ampicillin >16 Resistant mcg/mL     Amoxicillin/CA <=8/4 Sensitive mcg/mL     Ceftriaxone <=1 Sensitive mcg/mL     Cefazolin* 4 Sensitive mcg/mL      * Breakpoints when Cefazolin is used for therapy of infections  other than uncomplicated UTIs due to Enterobacterales are as  follows:  KRISTIN and Interpretation:  <=2 S  4 I  >=8 R       Ciprofloxacin <=0.25 Sensitive mcg/mL     Cefepime <=2 Sensitive mcg/mL     Cefuroxime <=4 Sensitive mcg/mL     Ampicillin/sulbactam 16/8 Intermediate mcg/mL     Tobramycin 8 Intermediate mcg/mL     Nitrofurantoin <=32 Sensitive mcg/mL     Gentamicin >8 Resistant mcg/mL     Levofloxacin <=0.5 Sensitive mcg/mL     Minocycline <=4 Sensitive mcg/mL     Pip/Tazobactam <=8 Sensitive mcg/mL     Trimeth/Sulfa >2/38 Resistant mcg/mL     Tigecycline <=2 Sensitive mcg/mL   HIV AG/AB COMBO ASSAY SCREENING    Collection Time: 05/23/24  3:16 PM   Result Value Ref Range    HIV Ag/Ab Combo Assay Non-Reactive Non Reactive   OP Prenatal Panel-Blood Bank    Collection Time: 05/23/24  3:16 PM   Result Value Ref Range    ABO Grouping Only O     Rh Grouping Only NEG     Antibody Screen Scrn NEG    PANORAMA PRENATAL TEST    Collection Time: 06/03/24  8:05 AM   Result Value Ref Range    REPORT SUMMARY LOW RISK     REPORT NOTE See Notes     GENDER OF FETUS Female     FETAL FRACTION 4.4%     TRISOMY 21 RESULT TEXT Low Risk     TRISOMY 21 AGE-BASED RISK TEXT 1/249 (0.4%)     TRISOMY 21 RISK SCORE TEXT <1/10,000 (<0.01%)     TRISOMY 18 RESULT TEXT Low Risk     TRISOMY 18 AGE-BASED RISK TEXT 1/580 (0.17%)     TRISOMY 18 RISK SCORE TEXT <1/10,000 (<0.01%)     TRISOMY 13 RESULT TEXT Low Risk     TRISOMY 13 AGE-BASED RISK  TEXT 1/1,826 (0.05%)     TRISOMY 13 RISK SCORE TEXT <1/10,000 (<0.01%)     MONOSOMY X RESULT TEXT Low Risk     MONOSOMY X AGE-BASED RISK TEXT 1/255 (0.39%)     MONOSOMY X RISK SCORE TEXT <1/10,000 (<0.01%)     TRIPLOIDY RESULT TEXT Low Risk     22Q11.2 DELETION SYNDROME RESULT TEXT Low Risk     22Q11.2 DELETION SYNDROME POPULATION-BASED RISK TEXT 1/2,000     22Q11.2 DELETION SYNDROME RISK SCORE TEXT 1/3,100     1P36 DELETION SYNDROME RESULT TEXT Low Risk     1P36 DELETION SYNDROME POPULATION-BASED RISK TEXT 1/5,000     1P36 DELETION SYNDROME RISK SCORE TEXT 1/6,300     ANGELMAN SYNDROME RESULT TEXT Risk Unchanged     ANGELMAN SYNDROME POPULATION-BASED RISK TEXT 1/12,000     ANGELMAN SYNDROME RISK SCORE TEXT 1/12,000     CRI-DU-CHAT SYNDROME RESULT TEXT Low Risk     CRI-DU-CHAT SYNDROME POPULATION-BASED RISK TEXT 1/20,000     CRI-DU-CHAT SYNDROME RISK SCORE TEXT 1/27,000     PRADER-WILLI SYNDROME RESULT TEXT Low Risk     PRADER-WILLI SYNDROME POPULATION-BASED RISK TEXT 1/10,000     PRADER-WILLI SYNDROME RISK SCORE TEXT 1/13,800     FOOTNOTES See Notes    AFP MATERNAL SERUM ALPHA-FETOPROTEIN    Collection Time: 06/25/24  3:29 PM   Result Value Ref Range    AFP Value -Eia 23 ng/mL    AFP MOM Value 1.14     Interpretation Screen Neg     [age] DAVID [age] yr    Maternal Weight 211.0 lbs.     Gest. Age on Collection Date 14 wks, 2 days     Gestational Age Based On Ultrasound     Multiple Pregnancy [place]     Race Nonblack     Insulin Dependent Diabetes No     Smoking No     Family Hx NTD No     Specimen See Note    URINE CULTURE(NEW)    Collection Time: 06/25/24  3:30 PM    Specimen: Urine   Result Value Ref Range    Significant Indicator NEG     Source UR     Site -     Culture Result Usual skin symone ,000 cfu/mL    T.PALLIDUM AB DELMIS (SCREENING)    Collection Time: 08/26/24  1:35 PM   Result Value Ref Range    Syphilis, Treponemal Qual Non-Reactive Non-Reactive   GLUCOSE 1HR GESTATIONAL    Collection  Time: 08/26/24  1:35 PM   Result Value Ref Range    Glucose, Post Dose 103 70 - 139 mg/dL   CBC WITHOUT DIFFERENTIAL    Collection Time: 08/26/24  1:35 PM   Result Value Ref Range    WBC 11.6 (H) 4.8 - 10.8 K/uL    RBC 4.10 (L) 4.20 - 5.40 M/uL    Hemoglobin 12.8 12.0 - 16.0 g/dL    Hematocrit 38.3 37.0 - 47.0 %    MCV 93.4 81.4 - 97.8 fL    MCH 31.2 27.0 - 33.0 pg    MCHC 33.4 32.2 - 35.5 g/dL    RDW 46.7 35.9 - 50.0 fL    Platelet Count 159 (L) 164 - 446 K/uL    MPV 12.0 9.0 - 12.9 fL   ANTIBODY SCREEN    Collection Time: 08/26/24  2:13 PM   Result Value Ref Range    Antibody Screen Scrn NEG    PROTEIN/CREAT RATIO URINE    Collection Time: 10/01/24 10:15 AM   Result Value Ref Range    Total Protein, Urine <6.0 0.0 - 15.0 mg/dL    Creatinine, Random Urine 16.00 mg/dL    Protein Creatinine Ratio 375 (H) 10 - 107 mg/g   CBC WITH DIFFERENTIAL    Collection Time: 10/01/24 10:45 AM   Result Value Ref Range    WBC 13.2 (H) 4.8 - 10.8 K/uL    RBC 3.88 (L) 4.20 - 5.40 M/uL    Hemoglobin 12.4 12.0 - 16.0 g/dL    Hematocrit 34.9 (L) 37.0 - 47.0 %    MCV 89.9 81.4 - 97.8 fL    MCH 32.0 27.0 - 33.0 pg    MCHC 35.5 32.2 - 35.5 g/dL    RDW 43.8 35.9 - 50.0 fL    Platelet Count 153 (L) 164 - 446 K/uL    MPV 11.7 9.0 - 12.9 fL    Neutrophils-Polys 77.90 (H) 44.00 - 72.00 %    Lymphocytes 14.80 (L) 22.00 - 41.00 %    Monocytes 3.70 0.00 - 13.40 %    Eosinophils 1.90 0.00 - 6.90 %    Basophils 0.20 0.00 - 1.80 %    Immature Granulocytes 1.50 (H) 0.00 - 0.90 %    Nucleated RBC 0.00 0.00 - 0.20 /100 WBC    Neutrophils (Absolute) 10.23 (H) 1.82 - 7.42 K/uL    Lymphs (Absolute) 1.95 1.00 - 4.80 K/uL    Monos (Absolute) 0.49 0.00 - 0.85 K/uL    Eos (Absolute) 0.25 0.00 - 0.51 K/uL    Baso (Absolute) 0.03 0.00 - 0.12 K/uL    Immature Granulocytes (abs) 0.20 (H) 0.00 - 0.11 K/uL    NRBC (Absolute) 0.00 K/uL   Comp Metabolic Panel    Collection Time: 10/01/24 10:45 AM   Result Value Ref Range    Sodium 137 135 - 145 mmol/L    Potassium 3.8  3.6 - 5.5 mmol/L    Chloride 109 96 - 112 mmol/L    Co2 20 20 - 33 mmol/L    Anion Gap 8.0 7.0 - 16.0    Glucose 116 (H) 65 - 99 mg/dL    Bun 8 8 - 22 mg/dL    Creatinine 0.64 0.50 - 1.40 mg/dL    Calcium 8.9 8.5 - 10.5 mg/dL    Correct Calcium 9.5 8.5 - 10.5 mg/dL    AST(SGOT) 15 12 - 45 U/L    ALT(SGPT) 8 2 - 50 U/L    Alkaline Phosphatase 76 30 - 99 U/L    Total Bilirubin <0.2 0.1 - 1.5 mg/dL    Albumin 3.2 3.2 - 4.9 g/dL    Total Protein 5.7 (L) 6.0 - 8.2 g/dL    Globulin 2.5 1.9 - 3.5 g/dL    A-G Ratio 1.3 g/dL   ESTIMATED GFR    Collection Time: 10/01/24 10:45 AM   Result Value Ref Range    GFR (CKD-EPI) 118 >60 mL/min/1.73 m 2   CBC WITHOUT DIFFERENTIAL    Collection Time: 10/09/24  7:44 AM   Result Value Ref Range    WBC 11.4 (H) 4.8 - 10.8 K/uL    RBC 4.17 (L) 4.20 - 5.40 M/uL    Hemoglobin 13.0 12.0 - 16.0 g/dL    Hematocrit 38.0 37.0 - 47.0 %    MCV 91.1 81.4 - 97.8 fL    MCH 31.2 27.0 - 33.0 pg    MCHC 34.2 32.2 - 35.5 g/dL    RDW 45.7 35.9 - 50.0 fL    Platelet Count 155 (L) 164 - 446 K/uL    MPV 12.1 9.0 - 12.9 fL   Comp Metabolic Panel    Collection Time: 10/09/24  7:44 AM   Result Value Ref Range    Sodium 137 135 - 145 mmol/L    Potassium 4.2 3.6 - 5.5 mmol/L    Chloride 107 96 - 112 mmol/L    Co2 20 20 - 33 mmol/L    Anion Gap 10.0 7.0 - 16.0    Glucose 94 65 - 99 mg/dL    Bun 7 (L) 8 - 22 mg/dL    Creatinine 0.68 0.50 - 1.40 mg/dL    Calcium 8.7 8.5 - 10.5 mg/dL    Correct Calcium 9.3 8.5 - 10.5 mg/dL    AST(SGOT) 18 12 - 45 U/L    ALT(SGPT) 12 2 - 50 U/L    Alkaline Phosphatase 86 30 - 99 U/L    Total Bilirubin 0.2 0.1 - 1.5 mg/dL    Albumin 3.2 3.2 - 4.9 g/dL    Total Protein 5.7 (L) 6.0 - 8.2 g/dL    Globulin 2.5 1.9 - 3.5 g/dL    A-G Ratio 1.3 g/dL   ESTIMATED GFR    Collection Time: 10/09/24  7:44 AM   Result Value Ref Range    GFR (CKD-EPI) 116 >60 mL/min/1.73 m 2   Comp Metabolic Panel    Collection Time: 10/15/24  7:50 AM   Result Value Ref Range    Sodium 139 135 - 145 mmol/L    Potassium  3.9 3.6 - 5.5 mmol/L    Chloride 103 96 - 112 mmol/L    Co2 20 20 - 33 mmol/L    Anion Gap 16.0 7.0 - 16.0    Glucose 92 65 - 99 mg/dL    Bun 8 8 - 22 mg/dL    Creatinine 0.69 0.50 - 1.40 mg/dL    Calcium 9.2 8.5 - 10.5 mg/dL    Correct Calcium 9.6 8.5 - 10.5 mg/dL    AST(SGOT) 16 12 - 45 U/L    ALT(SGPT) 11 2 - 50 U/L    Alkaline Phosphatase 91 30 - 99 U/L    Total Bilirubin 0.2 0.1 - 1.5 mg/dL    Albumin 3.5 3.2 - 4.9 g/dL    Total Protein 5.9 (L) 6.0 - 8.2 g/dL    Globulin 2.4 1.9 - 3.5 g/dL    A-G Ratio 1.5 g/dL   CBC WITHOUT DIFFERENTIAL    Collection Time: 10/15/24  7:50 AM   Result Value Ref Range    WBC 12.7 (H) 4.8 - 10.8 K/uL    RBC 4.18 (L) 4.20 - 5.40 M/uL    Hemoglobin 13.1 12.0 - 16.0 g/dL    Hematocrit 38.3 37.0 - 47.0 %    MCV 91.6 81.4 - 97.8 fL    MCH 31.3 27.0 - 33.0 pg    MCHC 34.2 32.2 - 35.5 g/dL    RDW 45.9 35.9 - 50.0 fL    Platelet Count 147 (L) 164 - 446 K/uL    MPV 12.1 9.0 - 12.9 fL   ESTIMATED GFR    Collection Time: 10/15/24  7:50 AM   Result Value Ref Range    GFR (CKD-EPI) 116 >60 mL/min/1.73 m 2   Comp Metabolic Panel    Collection Time: 10/22/24  7:31 AM   Result Value Ref Range    Sodium 136 135 - 145 mmol/L    Potassium 3.9 3.6 - 5.5 mmol/L    Chloride 103 96 - 112 mmol/L    Co2 20 20 - 33 mmol/L    Anion Gap 13.0 7.0 - 16.0    Glucose 96 65 - 99 mg/dL    Bun 7 (L) 8 - 22 mg/dL    Creatinine 0.67 0.50 - 1.40 mg/dL    Calcium 8.8 8.5 - 10.5 mg/dL    Correct Calcium 9.4 8.5 - 10.5 mg/dL    AST(SGOT) 11 (L) 12 - 45 U/L    ALT(SGPT) 9 2 - 50 U/L    Alkaline Phosphatase 94 30 - 99 U/L    Total Bilirubin 0.2 0.1 - 1.5 mg/dL    Albumin 3.2 3.2 - 4.9 g/dL    Total Protein 5.8 (L) 6.0 - 8.2 g/dL    Globulin 2.6 1.9 - 3.5 g/dL    A-G Ratio 1.2 g/dL   CBC WITHOUT DIFFERENTIAL    Collection Time: 10/22/24  7:31 AM   Result Value Ref Range    WBC 12.1 (H) 4.8 - 10.8 K/uL    RBC 4.15 (L) 4.20 - 5.40 M/uL    Hemoglobin 13.0 12.0 - 16.0 g/dL    Hematocrit 38.0 37.0 - 47.0 %    MCV 91.6 81.4 -  97.8 fL    MCH 31.3 27.0 - 33.0 pg    MCHC 34.2 32.2 - 35.5 g/dL    RDW 46.3 35.9 - 50.0 fL    Platelet Count 150 (L) 164 - 446 K/uL    MPV 12.1 9.0 - 12.9 fL   ESTIMATED GFR    Collection Time: 10/22/24  7:31 AM   Result Value Ref Range    GFR (CKD-EPI) 117 >60 mL/min/1.73 m 2   Comp Metabolic Panel    Collection Time: 10/29/24  7:16 AM   Result Value Ref Range    Sodium 135 135 - 145 mmol/L    Potassium 4.0 3.6 - 5.5 mmol/L    Chloride 103 96 - 112 mmol/L    Co2 19 (L) 20 - 33 mmol/L    Anion Gap 13.0 7.0 - 16.0    Glucose 96 65 - 99 mg/dL    Bun 9 8 - 22 mg/dL    Creatinine 0.62 0.50 - 1.40 mg/dL    Calcium 8.8 8.5 - 10.5 mg/dL    Correct Calcium 9.5 8.5 - 10.5 mg/dL    AST(SGOT) 17 12 - 45 U/L    ALT(SGPT) 10 2 - 50 U/L    Alkaline Phosphatase 97 30 - 99 U/L    Total Bilirubin 0.2 0.1 - 1.5 mg/dL    Albumin 3.1 (L) 3.2 - 4.9 g/dL    Total Protein 5.8 (L) 6.0 - 8.2 g/dL    Globulin 2.7 1.9 - 3.5 g/dL    A-G Ratio 1.1 g/dL   CBC WITHOUT DIFFERENTIAL    Collection Time: 10/29/24  7:16 AM   Result Value Ref Range    WBC 11.4 (H) 4.8 - 10.8 K/uL    RBC 4.04 (L) 4.20 - 5.40 M/uL    Hemoglobin 12.5 12.0 - 16.0 g/dL    Hematocrit 37.4 37.0 - 47.0 %    MCV 92.6 81.4 - 97.8 fL    MCH 30.9 27.0 - 33.0 pg    MCHC 33.4 32.2 - 35.5 g/dL    RDW 47.4 35.9 - 50.0 fL    Platelet Count 152 (L) 164 - 446 K/uL    MPV 12.2 9.0 - 12.9 fL   ESTIMATED GFR    Collection Time: 10/29/24  7:16 AM   Result Value Ref Range    GFR (CKD-EPI) 119 >60 mL/min/1.73 m 2   GRP B STREP, BY PCR (MAXWELL BROTH)    Collection Time: 11/05/24 10:42 AM    Specimen: Genital   Result Value Ref Range    Strep Gp B DNA PCR Negative Negative   CBC WITHOUT DIFFERENTIAL    Collection Time: 11/07/24  7:05 AM   Result Value Ref Range    WBC 12.7 (H) 4.8 - 10.8 K/uL    RBC 4.16 (L) 4.20 - 5.40 M/uL    Hemoglobin 13.0 12.0 - 16.0 g/dL    Hematocrit 39.3 37.0 - 47.0 %    MCV 94.5 81.4 - 97.8 fL    MCH 31.3 27.0 - 33.0 pg    MCHC 33.1 32.2 - 35.5 g/dL    RDW 48.6 35.9 -  50.0 fL    Platelet Count 151 (L) 164 - 446 K/uL    MPV 11.9 9.0 - 12.9 fL   Comp Metabolic Panel    Collection Time: 24  7:05 AM   Result Value Ref Range    Sodium 137 135 - 145 mmol/L    Potassium 4.1 3.6 - 5.5 mmol/L    Chloride 108 96 - 112 mmol/L    Co2 20 20 - 33 mmol/L    Anion Gap 9.0 7.0 - 16.0    Glucose 95 65 - 99 mg/dL    Bun 8 8 - 22 mg/dL    Creatinine 0.70 0.50 - 1.40 mg/dL    Calcium 8.5 8.5 - 10.5 mg/dL    Correct Calcium 9.1 8.5 - 10.5 mg/dL    AST(SGOT) 16 12 - 45 U/L    ALT(SGPT) 10 2 - 50 U/L    Alkaline Phosphatase 109 (H) 30 - 99 U/L    Total Bilirubin <0.2 0.1 - 1.5 mg/dL    Albumin 3.3 3.2 - 4.9 g/dL    Total Protein 5.9 (L) 6.0 - 8.2 g/dL    Globulin 2.6 1.9 - 3.5 g/dL    A-G Ratio 1.3 g/dL   ESTIMATED GFR    Collection Time: 24  7:05 AM   Result Value Ref Range    GFR (CKD-EPI) 115 >60 mL/min/1.73 m 2            Assessment:   Jami rGegory at 37w1d  Labor status: Not in labor.  Obstetrical history significant for   Patient Active Problem List    Diagnosis Date Noted    Labor and delivery, indication for care 2024    Pre-eclampsia without SF 10/01/2024    Rh negative state in antepartum period 2024    Elevated blood pressure reading 2024    Multigravida of advanced maternal age in third trimester 2024   .      Plan:     1. Admit to L&D for Induction of Labor for pre-eclampsia without severe features  2. GBS negative.   3. Desires  Epidural for pain relief.   4. Plan at this time is cytotec vaginally every 4 hours for total 6 doses. Plan for cervical ripening balloon or Pitocin prn/  5. Pre-e without severe features: Discuss that Bps have been mostly normal per chart and reported home measurements. Labs ordered. Will continue to monitor and treat prn.  6. Anticipate        Angelito Gilmore CNM/ Dr De Anda Attending

## 2024-11-12 NOTE — PROGRESS NOTES
"To room to review POC with patient. Patient has been admitted for IOL for gestational hypertension. She has been normotensive throughout her admission, no elevated blood pressures have been identified. Serum labs have been reassuring, without evidence of end organ dysfunction. TP:Cr 86. On chart review, she has had a single elevated blood pressure, 148/74, on 10/1/2024. Her vitals have been normotensive since then. Reviewed patient's clinical course with my partner, Dr. Krueger, who agrees that patient has not yet met diagnostic criteria for gestational hypertension, or pre-eclampsia with severe features. She has received 1 doses of Misoprostol over the course of her admission and has made minimal cervical change; she is not yet in labor. We reviewed her blood pressures, lab findings, and NST and are in agreement that, should patient desire, the following plan would be reasonable: discharge from labor and delivery, with expectant management, and return in 2 weeks for elective induction. Plan for 2x weekly testing until then.    Vitals:    11/12/24 0315 11/12/24 0705 11/12/24 1400   BP: 127/84 112/58 109/68   Pulse: 98 81 (!) 104   Resp: 19 16    Temp: 36.6 °C (97.8 °F) 36.4 °C (97.6 °F)    TempSrc: Temporal Temporal    SpO2: 98% 100%    Weight: 105 kg (231 lb)     Height: 1.651 m (5' 5\")       GEN: NAD, speaking full sentences without distress  HEENT: NCAT, PERRLA, moist oral mucosa, anicteric, without pallor  CVS: Extremities warm and well perfused  LUNGS: Unlabored breathing  ABD: Soft, gravid, nontender, nondistended  BACK: No CVAT  EXT: No cyanosis or edema  NEURO: No focal deficits    EFM: Baseline 145 bpm, moderate variability, + accels, no decels  [place]: Irregular contractile activity  SVE: 1/L/H per RN exam    I sat down with Jami and her  and reviewed the above. We discussed that, should she wish, since she is already admitted with the plan for induction, we could continue the process, with plan for " cervical balloon placement as next step for induction. Alternatively, if she would prefer, since she has had reassuring external fetal monitoring, blood pressure trend, and lab findings, she could be discharged with the above mentioned plan and return in 2 weeks for elective induction, if she does not go into labor sooner. Jami asked appropriate questions and after discussion with her , elected to be discharged with plan for 2x weekly testing and expectant management. Warning signs for pre-eclampsia were discussed with patient. Pt should come to L&D Triage if she has headache which does not respond to tylenol, changes in vision, RUQ or epigastric pain, or acute worsening of swelling.   Labor precautions were reviewed. Patient was instructed to come to or call L+D Triage for frequent contractions, loss of fluid, vaginal bleeding, or for any decrease in fetal movement.     Unable to reach clinic coordinator by phone, though sent message to scheduling pool, patient instructed to call in AM to schedule appointment if she is not contacted sooner. All questions answered.    Isamar Philip MD MPH

## 2024-11-12 NOTE — PROGRESS NOTES
0700: Bedside report done with Peggy RN  0730: Angelito GRIFFITHS ok with patient eating breakfast.   0830: Dr. Leavitt bedside, cytotec given to pt by Dr. Romero  1400: Dr. Philip bedside. Pt given the option to continue with labor or go home.   1430: SVE 1/40/-2  1500: Pt made decision to go home.   1515: Pt given discharge instructions and education about when to come back.

## 2024-11-12 NOTE — PROGRESS NOTES
0310- Pt is a  at 37.1 weeks, pt presents to L&D for scheduled IOL. Pt reports +FM, denies VB/LOF/UC's. EFM and TOCO applied, VS taken, pt comfortable in bed at this time. Orders received. Care assumed.     0700- Report given to Melani BILL. POC discussed, all questions answered, pt stable in bed at this time. Care relinquished.

## 2024-11-12 NOTE — CARE PLAN
Problem: Pain  Goal: Patient's pain will be alleviated or reduced to the patient’s comfort goal  Outcome: Progressing  Note: Pt educated about pain medications available to her   The patient is Stable - Low risk of patient condition declining or worsening    Shift Goals  Clinical Goals: cervical dilation  Patient Goals: healthy mom and baby  Family Goals: support    Progress made toward(s) clinical / shift goals:      Patient is not progressing towards the following goals:

## 2024-11-12 NOTE — CARE PLAN
The patient is Watcher - Medium risk of patient condition declining or worsening    Shift Goals  Clinical Goals: cervical dilation  Patient Goals: healthy vaginal delivery  Family Goals: provide support    Progress made toward(s) clinical / shift goals:    Problem: Knowledge Deficit - L&D  Goal: Patient and family/caregivers will demonstrate understanding of plan of care, disease process/condition, diagnostic tests and medications  Outcome: Progressing  Note: Patient educated and involved in plan of care, no outstanding questions or concerns at this time.     Problem: Pain  Goal: Patient's pain will be alleviated or reduced to the patient’s comfort goal  Outcome: Progressing  Note: Patient coping well at this time, verbalizes needs as they arise.        Patient is not progressing towards the following goals:

## 2024-11-13 NOTE — DISCHARGE SUMMARY
LABOR & Delivery Discharge Summary       Dates of admission: 2024 to 2024    Discharge diagnosis:     1. 34 y.o.  at 37 weeks 1 days gestation     Reason for Admission:     Patient admitted for IOL for concern for gHTN vs. Pre-eclampsia without severe features    Hospital Course:     Jami Gregory is a 34 y.o.  at 37w1d who was admitted for concern for gHTN vs. Pre-eclampsia without severe features. Patient had isolated elevated blood pressure of 148/74 on 10/1/2024. This finding did not recur in clinic and she remained normotensive while admitted to L&D. Her pre-eclampsia labs were without evidence of end organ dysfunction and TP:Cr 86. She had no signs or symptoms of severe disease. It was thus determined that diagnosis of gestational hypertension (and therefore, pre-eclampsia), was unlikely. She did receive 2 doses of misoprostol for cervical ripening, though was ultimately found to not be in labor, with minimal cervical change on repeat examination. External fetal monitoring was reassuring throughout course of admission. Given her reassuring blood pressure trend, lab findings, and fetal monitoring, patient was offered the option of continuing labor induction vs. Discharge home. Patient elected to DC home with plan to return to clinic for 2x weekly testing. Notice send to clinic schedulers and manager.        DISCHARGE PROGRESS NOTE: PLEASE SEE SEPARATE NOTE WRITTEN BE ME     Discharge Instructions:     I sat down with Jami and her  and reviewed the above. We discussed that, should she wish, since she is already admitted with the plan for induction, we could continue the process, with plan for cervical balloon placement as next step for induction. Alternatively, if she would prefer, since she has had reassuring external fetal monitoring, blood pressure trend, and lab findings, she could be discharged with the above mentioned plan and return in 2 weeks for elective  induction, if she does not go into labor sooner. Jami asked appropriate questions and after discussion with her , elected to be discharged with plan for 2x weekly testing and expectant management. Warning signs for pre-eclampsia were discussed with patient. Pt should come to L&D Triage if she has headache which does not respond to tylenol, changes in vision, RUQ or epigastric pain, or acute worsening of swelling.   Labor precautions were reviewed. Patient was instructed to come to or call L+D Triage for frequent contractions, loss of fluid, vaginal bleeding, or for any decrease in fetal movement.      Unable to reach clinic coordinator by phone, though sent message to scheduling pool, patient instructed to call in AM to schedule appointment if she is not contacted sooner. All questions answered.     Isamar Philip MD MPH

## 2024-11-15 ENCOUNTER — ROUTINE PRENATAL (OUTPATIENT)
Dept: OBGYN | Facility: CLINIC | Age: 34
End: 2024-11-15
Payer: COMMERCIAL

## 2024-11-15 ENCOUNTER — NON-PROVIDER VISIT (OUTPATIENT)
Dept: OBGYN | Facility: CLINIC | Age: 34
End: 2024-11-15
Payer: COMMERCIAL

## 2024-11-15 VITALS — WEIGHT: 230.7 LBS | SYSTOLIC BLOOD PRESSURE: 108 MMHG | BODY MASS INDEX: 38.39 KG/M2 | DIASTOLIC BLOOD PRESSURE: 76 MMHG

## 2024-11-15 DIAGNOSIS — O09.523 MULTIGRAVIDA OF ADVANCED MATERNAL AGE IN THIRD TRIMESTER: ICD-10-CM

## 2024-11-15 DIAGNOSIS — O13.3 GESTATIONAL HYPERTENSION, THIRD TRIMESTER: ICD-10-CM

## 2024-11-15 DIAGNOSIS — O14.93 PRE-ECLAMPSIA IN THIRD TRIMESTER: ICD-10-CM

## 2024-11-15 PROCEDURE — 0502F SUBSEQUENT PRENATAL CARE: CPT | Performed by: OBSTETRICS & GYNECOLOGY

## 2024-11-15 PROCEDURE — 59025 FETAL NON-STRESS TEST: CPT | Performed by: OBSTETRICS & GYNECOLOGY

## 2024-11-15 ASSESSMENT — FIBROSIS 4 INDEX: FIB4 SCORE: 1.85

## 2024-11-15 NOTE — PROGRESS NOTES
Pt here for OB visit  Pt had flu vaccine at [facility] a week after receiving T-dap   Cervical check done today

## 2024-11-15 NOTE — PROGRESS NOTES
OB Visit Note - 37w4d     MEDICAL DECISION MAKING:  Jami Gregory is a 34 y.o. female  at 37w4d    Today's visit addressed:   Doing well. Denies HA, visual changes or RUQ pain. No CTX, VB or LOF. Good FM.     Pregnancy complicated by:  Patient Active Problem List   Diagnosis    Elevated blood pressure reading    Multigravida of advanced maternal age in third trimester    Rh negative state in antepartum period    Pre-eclampsia without SF    Labor and delivery, indication for care   gHTN - was admitted for IOL due to gHTN vs preeclampsia on , however cervix did not dilate despite multiple doses of misoprostol and her Bps were completely normal, no preeclamptic symptoms, normal fetal monitoring and normal P/C ratio/labs were observed. She was given the option for continuing the process of IOL vs being discharged and close monitoring as an outpatient and possible reattempt at IOL at 39 weeks. She opted to be discharged. Was set up for NSTs weekly and given preeclamptic precautions to return to the hospital should she have headache which does not respond to tylenol, changes in vision, RUQ or epigastric pain, or acute worsening of swelling. Since being discharged, she has had none of the above symptoms and feels baby move well. NST is reactive today - see note below and BP trends were normal in the office. 108/76, 114/67, 113/69, 127/81.   Cervix was closed but soft on my exam today and fetal station was nonengaged but VTX by ultrasound.   Discussed option of IOL at 39 weeks for gHTN and AMA. Given her cervix is still unfavorable, she is still at a high risk of ending up with a C section. Collaborative decision making done with patient. We decided on continuing  testing with twice weekly NSTs, reevaluation at next visit for cervical change and possible scheduling of IOL at 39 weeks at next visit. If no symptoms/signs of preeclampsia at 39 weeks, could consider waiting a bit longer to induce.  Patient and  satisfied with the above discussion and all questions addressed. Advised patient to present to the hospital should she have any of the preeclamptic symptoms discussed as above.     The patient will follow up in 1 week(s). She was counseled to call or return for vaginal bleeding, regular contractions, leakage of fluid or decreased fetal movement. Daily kick counts.     VINI Armstrong  Jami Gregory   Gestational age: 37w4d     Indication: gHTN, AMA  108/76, 114/67, 113/69, 127/81.   NST Interpretation:  Baseline 150s, Reactive    Jake De Anda M.D.

## 2024-11-15 NOTE — PROGRESS NOTES
Pt here for Ob follow up  Reports good FM   Pt denies VB and LOF  Chaperone offered and not indicated  Pt states she is experiencing some contractions. Pt has questions regarding induction date and would like some clarification.  Phone/pharm verfied [phone]

## 2024-11-19 ENCOUNTER — ROUTINE PRENATAL (OUTPATIENT)
Dept: OBGYN | Facility: CLINIC | Age: 34
End: 2024-11-19
Payer: COMMERCIAL

## 2024-11-19 ENCOUNTER — NON-PROVIDER VISIT (OUTPATIENT)
Dept: OBGYN | Facility: CLINIC | Age: 34
End: 2024-11-19
Payer: COMMERCIAL

## 2024-11-19 VITALS — BODY MASS INDEX: 38.37 KG/M2 | DIASTOLIC BLOOD PRESSURE: 62 MMHG | SYSTOLIC BLOOD PRESSURE: 123 MMHG | WEIGHT: 230.6 LBS

## 2024-11-19 DIAGNOSIS — O13.3 GESTATIONAL HYPERTENSION, THIRD TRIMESTER: ICD-10-CM

## 2024-11-19 DIAGNOSIS — O09.523 MULTIGRAVIDA OF ADVANCED MATERNAL AGE IN THIRD TRIMESTER: ICD-10-CM

## 2024-11-19 PROCEDURE — 0502F SUBSEQUENT PRENATAL CARE: CPT | Performed by: OBSTETRICS & GYNECOLOGY

## 2024-11-19 PROCEDURE — 59025 FETAL NON-STRESS TEST: CPT | Performed by: OBSTETRICS & GYNECOLOGY

## 2024-11-19 ASSESSMENT — FIBROSIS 4 INDEX: FIB4 SCORE: 1.85

## 2024-11-19 NOTE — PROGRESS NOTES
OB Visit Note - 38w1d     MEDICAL DECISION MAKING:  Jami Gregory is a 34 y.o. female  at 38w1d    Today's visit addressed:   Feeling well. Denies HA, visual changes or RUQ pain. No VB or LOF. Good FM.     Pregnancy complicated by:  Patient Active Problem List   Diagnosis    Elevated blood pressure reading    Multigravida of advanced maternal age in third trimester    Rh negative state in antepartum period    Pre-eclampsia without SF    Labor and delivery, indication for care   Collaborative decision making done with patient. Bps normal, NST reactive, no preeclamptic symptoms. SVE closed, soft posterior, high station.  Blanco score 3. Discussed still unfavorable cervix and significant risk of prolonged induction course and  delivery. With the above findings, reasonable to continue  testing, close monitoring of Bps/symptoms and reassess next week for cervical change. Patient agrees strongly with plan. Preeclamptic symptoms again reviewed.     The patient will follow up in 1 week(s). She was counseled to call or return for vaginal bleeding, regular contractions, leakage of fluid or decreased fetal movement, headache, visual changes or RUQ pain.     Jake De Anda M.D.     NST  Jami Gregory   Gestational age: 38w1d     Indication: Hypertension in pregnancy  Bps: 123/62, 118/57, 116/59, 116/62    NST Interpretation:  Baseline 150s, Reactive    Jake De Anda M.D.

## 2024-11-19 NOTE — PROGRESS NOTES
Pt. Here for OB/FU.   Reports Good FM.   Pt. Denies VB, LOF, or UC's.   Pt states   Phone/Pharm verified.

## 2024-11-19 NOTE — PROGRESS NOTES
Pt. Here for OB/FU.   Reports Good FM.   Pt. Denies VB, LOF, or UC's.   Pt states no concerns   Phone/Pharm verified.

## 2024-11-22 ENCOUNTER — NON-PROVIDER VISIT (OUTPATIENT)
Dept: OBGYN | Facility: CLINIC | Age: 34
End: 2024-11-22
Payer: COMMERCIAL

## 2024-11-22 ENCOUNTER — APPOINTMENT (OUTPATIENT)
Dept: OBGYN | Facility: CLINIC | Age: 34
End: 2024-11-22
Payer: COMMERCIAL

## 2024-11-22 VITALS — DIASTOLIC BLOOD PRESSURE: 72 MMHG | BODY MASS INDEX: 37.94 KG/M2 | WEIGHT: 228 LBS | SYSTOLIC BLOOD PRESSURE: 119 MMHG

## 2024-11-22 PROCEDURE — 59025 FETAL NON-STRESS TEST: CPT | Performed by: PHYSICIAN ASSISTANT

## 2024-11-22 ASSESSMENT — FIBROSIS 4 INDEX: FIB4 SCORE: 1.85

## 2024-11-22 NOTE — PROGRESS NOTES
Jami Lokaren Gregory   Gestational age: 38w4d     Indications: AMA, gHTN  Baseline 140, reactive, Variability  6-25 BPM, Accelerations: Yes, Decelerations: No      Review of last visit 11/19 with Dr De Anda. Shared decision to hold on IOL due to stable BP and unfavorable cervix. Had follow up next week.     Tessa Olivo P.A.-C.  [facility]

## 2024-11-26 ENCOUNTER — APPOINTMENT (OUTPATIENT)
Dept: OBGYN | Facility: CLINIC | Age: 34
End: 2024-11-26
Payer: COMMERCIAL

## 2024-11-27 ENCOUNTER — NON-PROVIDER VISIT (OUTPATIENT)
Dept: OBGYN | Facility: CLINIC | Age: 34
End: 2024-11-27
Payer: COMMERCIAL

## 2024-11-27 ENCOUNTER — ROUTINE PRENATAL (OUTPATIENT)
Dept: OBGYN | Facility: CLINIC | Age: 34
End: 2024-11-27
Payer: COMMERCIAL

## 2024-11-27 VITALS — BODY MASS INDEX: 38.61 KG/M2 | WEIGHT: 232 LBS | DIASTOLIC BLOOD PRESSURE: 76 MMHG | SYSTOLIC BLOOD PRESSURE: 118 MMHG

## 2024-11-27 DIAGNOSIS — O09.523 MULTIGRAVIDA OF ADVANCED MATERNAL AGE IN THIRD TRIMESTER: ICD-10-CM

## 2024-11-27 ASSESSMENT — FIBROSIS 4 INDEX: FIB4 SCORE: 1.85

## 2024-11-27 NOTE — PROGRESS NOTES
OB Visit Note - 39w2d     MEDICAL DECISION MAKING:  Jami Gregory is a 34 y.o. female  at 39w2d    Today's visit addressed:   Denies CTX, but feeling more pressure vaginally. No VB or LOF. Good FM.   Denies HA, visual changes or RUQ pain.     Pregnancy complicated by:  Patient Active Problem List   Diagnosis    Elevated blood pressure reading    Multigravida of advanced maternal age in third trimester    Rh negative state in antepartum period    Pre-eclampsia without SF    Labor and delivery, indication for care   SVE: 2/70/-2, Blanco score 7. Offered IOL this week in setting of AMA. She is interested in this. Request placed for 24.   She was counseled to call or return for vaginal bleeding, regular contractions, leakage of fluid or decreased fetal movement.    Jake De Anda M.D.     NST  Jami Gregory   Gestational age: 39w2d     Indication: Elevated blood pressure in pregnancy  BP: 118/76  NST Interpretation:  Baseline 150s, Reactive    aJke De Anda M.D.

## 2024-11-30 ENCOUNTER — ANESTHESIA EVENT (OUTPATIENT)
Dept: ANESTHESIOLOGY | Facility: MEDICAL CENTER | Age: 34
End: 2024-11-30
Payer: COMMERCIAL

## 2024-11-30 ENCOUNTER — APPOINTMENT (OUTPATIENT)
Dept: OBGYN | Facility: MEDICAL CENTER | Age: 34
End: 2024-11-30
Attending: OBSTETRICS & GYNECOLOGY
Payer: COMMERCIAL

## 2024-11-30 ENCOUNTER — ANESTHESIA (OUTPATIENT)
Dept: ANESTHESIOLOGY | Facility: MEDICAL CENTER | Age: 34
End: 2024-11-30
Payer: COMMERCIAL

## 2024-11-30 ENCOUNTER — HOSPITAL ENCOUNTER (INPATIENT)
Facility: MEDICAL CENTER | Age: 34
End: 2024-11-30
Attending: OBSTETRICS & GYNECOLOGY | Admitting: OBSTETRICS & GYNECOLOGY
Payer: COMMERCIAL

## 2024-11-30 VITALS
WEIGHT: 232 LBS | TEMPERATURE: 97.8 F | HEIGHT: 65 IN | RESPIRATION RATE: 18 BRPM | OXYGEN SATURATION: 95 % | SYSTOLIC BLOOD PRESSURE: 113 MMHG | BODY MASS INDEX: 38.65 KG/M2 | HEART RATE: 100 BPM | DIASTOLIC BLOOD PRESSURE: 78 MMHG

## 2024-11-30 PROBLEM — Z34.90 ENCOUNTER FOR INDUCTION OF LABOR: Status: ACTIVE | Noted: 2024-11-30

## 2024-11-30 LAB
ALBUMIN SERPL BCP-MCNC: 3.6 G/DL (ref 3.2–4.9)
ALBUMIN/GLOB SERPL: 1.3 G/DL
ALP SERPL-CCNC: 142 U/L (ref 30–99)
ALT SERPL-CCNC: 8 U/L (ref 2–50)
ANION GAP SERPL CALC-SCNC: 13 MMOL/L (ref 7–16)
AST SERPL-CCNC: 17 U/L (ref 12–45)
BASOPHILS # BLD AUTO: 0.2 % (ref 0–1.8)
BASOPHILS # BLD: 0.03 K/UL (ref 0–0.12)
BILIRUB SERPL-MCNC: 0.3 MG/DL (ref 0.1–1.5)
BUN SERPL-MCNC: 10 MG/DL (ref 8–22)
CALCIUM ALBUM COR SERPL-MCNC: 9.7 MG/DL (ref 8.5–10.5)
CALCIUM SERPL-MCNC: 9.4 MG/DL (ref 8.5–10.5)
CHLORIDE SERPL-SCNC: 106 MMOL/L (ref 96–112)
CO2 SERPL-SCNC: 18 MMOL/L (ref 20–33)
CREAT SERPL-MCNC: 0.74 MG/DL (ref 0.5–1.4)
CREAT UR-MCNC: 23.76 MG/DL
CRYSTALS AMN MICRO: NORMAL
EOSINOPHIL # BLD AUTO: 0.04 K/UL (ref 0–0.51)
EOSINOPHIL NFR BLD: 0.3 % (ref 0–6.9)
ERYTHROCYTE [DISTWIDTH] IN BLOOD BY AUTOMATED COUNT: 44.6 FL (ref 35.9–50)
GFR SERPLBLD CREATININE-BSD FMLA CKD-EPI: 108 ML/MIN/1.73 M 2
GLOBULIN SER CALC-MCNC: 2.8 G/DL (ref 1.9–3.5)
GLUCOSE SERPL-MCNC: 124 MG/DL (ref 65–99)
HCT VFR BLD AUTO: 39.5 % (ref 37–47)
HGB BLD-MCNC: 14.2 G/DL (ref 12–16)
HOLDING TUBE BB 8507: NORMAL
IMM GRANULOCYTES # BLD AUTO: 0.16 K/UL (ref 0–0.11)
IMM GRANULOCYTES NFR BLD AUTO: 1.2 % (ref 0–0.9)
LYMPHOCYTES # BLD AUTO: 2.07 K/UL (ref 1–4.8)
LYMPHOCYTES NFR BLD: 16.2 % (ref 22–41)
MCH RBC QN AUTO: 31.9 PG (ref 27–33)
MCHC RBC AUTO-ENTMCNC: 35.9 G/DL (ref 32.2–35.5)
MCV RBC AUTO: 88.8 FL (ref 81.4–97.8)
MONOCYTES # BLD AUTO: 0.36 K/UL (ref 0–0.85)
MONOCYTES NFR BLD AUTO: 2.8 % (ref 0–13.4)
NEUTROPHILS # BLD AUTO: 10.15 K/UL (ref 1.82–7.42)
NEUTROPHILS NFR BLD: 79.3 % (ref 44–72)
NRBC # BLD AUTO: 0 K/UL
NRBC BLD-RTO: 0 /100 WBC (ref 0–0.2)
PLATELET # BLD AUTO: 141 K/UL (ref 164–446)
PMV BLD AUTO: 12 FL (ref 9–12.9)
POTASSIUM SERPL-SCNC: 3.6 MMOL/L (ref 3.6–5.5)
PROT SERPL-MCNC: 6.4 G/DL (ref 6–8.2)
PROT UR-MCNC: <4 MG/DL (ref 0–15)
PROT/CREAT UR: NORMAL MG/G (ref 10–107)
RBC # BLD AUTO: 4.45 M/UL (ref 4.2–5.4)
SODIUM SERPL-SCNC: 137 MMOL/L (ref 135–145)
T PALLIDUM AB SER QL IA: NONREACTIVE
WBC # BLD AUTO: 12.8 K/UL (ref 4.8–10.8)

## 2024-11-30 PROCEDURE — 36415 COLL VENOUS BLD VENIPUNCTURE: CPT

## 2024-11-30 PROCEDURE — 86780 TREPONEMA PALLIDUM: CPT

## 2024-11-30 PROCEDURE — 700111 HCHG RX REV CODE 636 W/ 250 OVERRIDE (IP): Performed by: OBSTETRICS & GYNECOLOGY

## 2024-11-30 PROCEDURE — 700111 HCHG RX REV CODE 636 W/ 250 OVERRIDE (IP)

## 2024-11-30 PROCEDURE — 89060 EXAM SYNOVIAL FLUID CRYSTALS: CPT

## 2024-11-30 PROCEDURE — 82570 ASSAY OF URINE CREATININE: CPT

## 2024-11-30 PROCEDURE — 700111 HCHG RX REV CODE 636 W/ 250 OVERRIDE (IP): Performed by: STUDENT IN AN ORGANIZED HEALTH CARE EDUCATION/TRAINING PROGRAM

## 2024-11-30 PROCEDURE — 700101 HCHG RX REV CODE 250: Performed by: STUDENT IN AN ORGANIZED HEALTH CARE EDUCATION/TRAINING PROGRAM

## 2024-11-30 PROCEDURE — 770002 HCHG ROOM/CARE - OB PRIVATE (112)

## 2024-11-30 PROCEDURE — 84156 ASSAY OF PROTEIN URINE: CPT

## 2024-11-30 PROCEDURE — 59409 OBSTETRICAL CARE: CPT

## 2024-11-30 PROCEDURE — 700102 HCHG RX REV CODE 250 W/ 637 OVERRIDE(OP)

## 2024-11-30 PROCEDURE — 3E033VJ INTRODUCTION OF OTHER HORMONE INTO PERIPHERAL VEIN, PERCUTANEOUS APPROACH: ICD-10-PCS | Performed by: OBSTETRICS & GYNECOLOGY

## 2024-11-30 PROCEDURE — 303615 HCHG EPIDURAL/SPINAL ANESTHESIA FOR LABOR

## 2024-11-30 PROCEDURE — 304965 HCHG RECOVERY SERVICES

## 2024-11-30 PROCEDURE — 85025 COMPLETE CBC W/AUTO DIFF WBC: CPT

## 2024-11-30 PROCEDURE — 0KQM0ZZ REPAIR PERINEUM MUSCLE, OPEN APPROACH: ICD-10-PCS | Performed by: OBSTETRICS & GYNECOLOGY

## 2024-11-30 PROCEDURE — 700105 HCHG RX REV CODE 258

## 2024-11-30 PROCEDURE — 99999 PR NO CHARGE: CPT | Performed by: OBSTETRICS & GYNECOLOGY

## 2024-11-30 PROCEDURE — A9270 NON-COVERED ITEM OR SERVICE: HCPCS

## 2024-11-30 PROCEDURE — 700105 HCHG RX REV CODE 258: Performed by: OBSTETRICS & GYNECOLOGY

## 2024-11-30 PROCEDURE — 80053 COMPREHEN METABOLIC PANEL: CPT

## 2024-11-30 RX ORDER — ONDANSETRON 2 MG/ML
4 INJECTION INTRAMUSCULAR; INTRAVENOUS EVERY 6 HOURS PRN
Status: DISCONTINUED | OUTPATIENT
Start: 2024-11-30 | End: 2024-12-01 | Stop reason: HOSPADM

## 2024-11-30 RX ORDER — IBUPROFEN 800 MG/1
800 TABLET, FILM COATED ORAL
Status: COMPLETED | OUTPATIENT
Start: 2024-11-30 | End: 2024-11-30

## 2024-11-30 RX ORDER — ONDANSETRON 4 MG/1
4 TABLET, ORALLY DISINTEGRATING ORAL EVERY 6 HOURS PRN
Status: DISCONTINUED | OUTPATIENT
Start: 2024-11-30 | End: 2024-12-01 | Stop reason: HOSPADM

## 2024-11-30 RX ORDER — TERBUTALINE SULFATE 1 MG/ML
0.25 INJECTION, SOLUTION SUBCUTANEOUS
Status: DISCONTINUED | OUTPATIENT
Start: 2024-11-30 | End: 2024-12-01 | Stop reason: HOSPADM

## 2024-11-30 RX ORDER — EPHEDRINE SULFATE 50 MG/ML
5 INJECTION, SOLUTION INTRAVENOUS
Status: DISCONTINUED | OUTPATIENT
Start: 2024-11-30 | End: 2024-12-01 | Stop reason: HOSPADM

## 2024-11-30 RX ORDER — SODIUM CHLORIDE, SODIUM LACTATE, POTASSIUM CHLORIDE, CALCIUM CHLORIDE 600; 310; 30; 20 MG/100ML; MG/100ML; MG/100ML; MG/100ML
INJECTION, SOLUTION INTRAVENOUS CONTINUOUS
Status: DISCONTINUED | OUTPATIENT
Start: 2024-11-30 | End: 2024-12-02 | Stop reason: HOSPADM

## 2024-11-30 RX ORDER — LIDOCAINE HYDROCHLORIDE AND EPINEPHRINE 15; 5 MG/ML; UG/ML
INJECTION, SOLUTION EPIDURAL PRN
Status: DISCONTINUED | OUTPATIENT
Start: 2024-11-30 | End: 2024-11-30 | Stop reason: SURG

## 2024-11-30 RX ORDER — SODIUM CHLORIDE 9 MG/ML
INJECTION, SOLUTION INTRAVENOUS CONTINUOUS
Status: DISCONTINUED | OUTPATIENT
Start: 2024-11-30 | End: 2024-11-30

## 2024-11-30 RX ORDER — OXYTOCIN 10 [USP'U]/ML
10 INJECTION, SOLUTION INTRAMUSCULAR; INTRAVENOUS
Status: DISCONTINUED | OUTPATIENT
Start: 2024-11-30 | End: 2024-12-01 | Stop reason: HOSPADM

## 2024-11-30 RX ORDER — BUPIVACAINE HYDROCHLORIDE 2.5 MG/ML
INJECTION, SOLUTION EPIDURAL; INFILTRATION; INTRACAUDAL
Status: COMPLETED
Start: 2024-11-30 | End: 2024-11-30

## 2024-11-30 RX ORDER — ALUMINA, MAGNESIA, AND SIMETHICONE 2400; 2400; 240 MG/30ML; MG/30ML; MG/30ML
30 SUSPENSION ORAL EVERY 6 HOURS PRN
Status: DISCONTINUED | OUTPATIENT
Start: 2024-11-30 | End: 2024-12-01 | Stop reason: HOSPADM

## 2024-11-30 RX ORDER — SODIUM CHLORIDE, SODIUM LACTATE, POTASSIUM CHLORIDE, AND CALCIUM CHLORIDE .6; .31; .03; .02 G/100ML; G/100ML; G/100ML; G/100ML
250 INJECTION, SOLUTION INTRAVENOUS PRN
Status: DISCONTINUED | OUTPATIENT
Start: 2024-11-30 | End: 2024-12-01 | Stop reason: HOSPADM

## 2024-11-30 RX ORDER — HYDROXYZINE HYDROCHLORIDE 50 MG/1
50 TABLET, FILM COATED ORAL EVERY 6 HOURS PRN
Status: DISCONTINUED | OUTPATIENT
Start: 2024-11-30 | End: 2024-12-01 | Stop reason: HOSPADM

## 2024-11-30 RX ORDER — ACETAMINOPHEN 500 MG
1000 TABLET ORAL
Status: COMPLETED | OUTPATIENT
Start: 2024-11-30 | End: 2024-11-30

## 2024-11-30 RX ORDER — LIDOCAINE HYDROCHLORIDE 10 MG/ML
INJECTION, SOLUTION EPIDURAL; INFILTRATION; INTRACAUDAL; PERINEURAL PRN
Status: DISCONTINUED | OUTPATIENT
Start: 2024-11-30 | End: 2024-11-30 | Stop reason: SURG

## 2024-11-30 RX ORDER — SODIUM CHLORIDE, SODIUM LACTATE, POTASSIUM CHLORIDE, AND CALCIUM CHLORIDE .6; .31; .03; .02 G/100ML; G/100ML; G/100ML; G/100ML
1000 INJECTION, SOLUTION INTRAVENOUS
Status: DISCONTINUED | OUTPATIENT
Start: 2024-11-30 | End: 2024-12-01 | Stop reason: HOSPADM

## 2024-11-30 RX ORDER — ROPIVACAINE HYDROCHLORIDE 2 MG/ML
INJECTION, SOLUTION EPIDURAL; INFILTRATION; PERINEURAL CONTINUOUS
Status: DISCONTINUED | OUTPATIENT
Start: 2024-11-30 | End: 2024-12-02 | Stop reason: HOSPADM

## 2024-11-30 RX ORDER — LIDOCAINE HYDROCHLORIDE 10 MG/ML
20 INJECTION, SOLUTION INFILTRATION; PERINEURAL
Status: COMPLETED | OUTPATIENT
Start: 2024-11-30 | End: 2024-11-30

## 2024-11-30 RX ORDER — BUPIVACAINE HYDROCHLORIDE 2.5 MG/ML
INJECTION, SOLUTION EPIDURAL; INFILTRATION; INTRACAUDAL PRN
Status: DISCONTINUED | OUTPATIENT
Start: 2024-11-30 | End: 2024-11-30 | Stop reason: SURG

## 2024-11-30 RX ADMIN — LIDOCAINE HYDROCHLORIDE 20 MG: 10 INJECTION, SOLUTION EPIDURAL; INFILTRATION; INTRACAUDAL; PERINEURAL at 14:16

## 2024-11-30 RX ADMIN — ROPIVACAINE HYDROCHLORIDE: 2 INJECTION, SOLUTION EPIDURAL; INFILTRATION at 14:22

## 2024-11-30 RX ADMIN — BUPIVACAINE HYDROCHLORIDE 5 ML: 2.5 INJECTION, SOLUTION EPIDURAL; INFILTRATION; INTRACAUDAL at 14:25

## 2024-11-30 RX ADMIN — SODIUM CHLORIDE, POTASSIUM CHLORIDE, SODIUM LACTATE AND CALCIUM CHLORIDE: 600; 310; 30; 20 INJECTION, SOLUTION INTRAVENOUS at 12:30

## 2024-11-30 RX ADMIN — SODIUM CHLORIDE, POTASSIUM CHLORIDE, SODIUM LACTATE AND CALCIUM CHLORIDE: 600; 310; 30; 20 INJECTION, SOLUTION INTRAVENOUS at 15:49

## 2024-11-30 RX ADMIN — LIDOCAINE HYDROCHLORIDE,EPINEPHRINE BITARTRATE 3 ML: 15; .005 INJECTION, SOLUTION EPIDURAL; INFILTRATION; INTRACAUDAL; PERINEURAL at 14:22

## 2024-11-30 RX ADMIN — OXYTOCIN 2 MILLI-UNITS/MIN: 10 INJECTION, SOLUTION INTRAMUSCULAR; INTRAVENOUS at 13:46

## 2024-11-30 RX ADMIN — OXYTOCIN 10 UNITS: 10 INJECTION, SOLUTION INTRAMUSCULAR; INTRAVENOUS at 21:38

## 2024-11-30 RX ADMIN — IBUPROFEN 800 MG: 800 TABLET, FILM COATED ORAL at 22:39

## 2024-11-30 RX ADMIN — LIDOCAINE HYDROCHLORIDE 20 ML: 10 INJECTION, SOLUTION INFILTRATION; PERINEURAL at 21:55

## 2024-11-30 RX ADMIN — SODIUM CHLORIDE, POTASSIUM CHLORIDE, SODIUM LACTATE AND CALCIUM CHLORIDE: 600; 310; 30; 20 INJECTION, SOLUTION INTRAVENOUS at 13:48

## 2024-11-30 RX ADMIN — ACETAMINOPHEN 1000 MG: 500 TABLET ORAL at 22:39

## 2024-11-30 ASSESSMENT — PATIENT HEALTH QUESTIONNAIRE - PHQ9
SUM OF ALL RESPONSES TO PHQ9 QUESTIONS 1 AND 2: 0
2. FEELING DOWN, DEPRESSED, IRRITABLE, OR HOPELESS: NOT AT ALL
1. LITTLE INTEREST OR PLEASURE IN DOING THINGS: NOT AT ALL

## 2024-11-30 ASSESSMENT — PAIN DESCRIPTION - PAIN TYPE
TYPE: ACUTE PAIN

## 2024-11-30 ASSESSMENT — FIBROSIS 4 INDEX: FIB4 SCORE: 1.85

## 2024-11-30 ASSESSMENT — SOCIAL DETERMINANTS OF HEALTH (SDOH)
WITHIN THE PAST 12 MONTHS, YOU WORRIED THAT YOUR FOOD WOULD RUN OUT BEFORE YOU GOT THE MONEY TO BUY MORE: NEVER TRUE
WITHIN THE PAST 12 MONTHS, THE FOOD YOU BOUGHT JUST DIDN'T LAST AND YOU DIDN'T HAVE MONEY TO GET MORE: NEVER TRUE
WITHIN THE LAST YEAR, HAVE YOU BEEN AFRAID OF YOUR PARTNER OR EX-PARTNER?: NO
WITHIN THE LAST YEAR, HAVE YOU BEEN KICKED, HIT, SLAPPED, OR OTHERWISE PHYSICALLY HURT BY YOUR PARTNER OR EX-PARTNER?: NO
WITHIN THE LAST YEAR, HAVE YOU BEEN HUMILIATED OR EMOTIONALLY ABUSED IN OTHER WAYS BY YOUR PARTNER OR EX-PARTNER?: NO
IN THE PAST 12 MONTHS, HAS THE ELECTRIC, GAS, OIL, OR WATER COMPANY THREATENED TO SHUT OFF SERVICE IN YOUR HOME?: NO
WITHIN THE LAST YEAR, HAVE TO BEEN RAPED OR FORCED TO HAVE ANY KIND OF SEXUAL ACTIVITY BY YOUR PARTNER OR EX-PARTNER?: NO

## 2024-11-30 ASSESSMENT — LIFESTYLE VARIABLES
TOTAL SCORE: 0
EVER FELT BAD OR GUILTY ABOUT YOUR DRINKING: NO
HOW MANY TIMES IN THE PAST YEAR HAVE YOU HAD 5 OR MORE DRINKS IN A DAY: 0
HAVE PEOPLE ANNOYED YOU BY CRITICIZING YOUR DRINKING: NO
DOES PATIENT WANT TO STOP DRINKING: NO
ON A TYPICAL DAY WHEN YOU DRINK ALCOHOL HOW MANY DRINKS DO YOU HAVE: 0
ALCOHOL_USE: NO
AVERAGE NUMBER OF DAYS PER WEEK YOU HAVE A DRINK CONTAINING ALCOHOL: 0
HAVE YOU EVER FELT YOU SHOULD CUT DOWN ON YOUR DRINKING: NO
TOTAL SCORE: 0
EVER HAD A DRINK FIRST THING IN THE MORNING TO STEADY YOUR NERVES TO GET RID OF A HANGOVER: NO
TOTAL SCORE: 0
CONSUMPTION TOTAL: NEGATIVE

## 2024-11-30 ASSESSMENT — PAIN SCALES - GENERAL: PAIN_LEVEL: 0

## 2024-11-30 NOTE — H&P
OB H&P:    CC: Induction of labor    HPI:  Ms. Jami Gregory is a 34 y.o.  @ 39w5d by LMP who presented for induction of labor for advanced maternal age.    Contractions: No   Loss of fluid: No   Vaginal bleeding: No   Fetal movement: present    Prenatal Care with: [facility]    Complications this pregnancy:  Advanced maternal age, elevated blood pressure in pregnancy    Patient had an attempted induction of labor at 37w1d for gestational hypertension vs preeclampsia without severe features after she had elevated blood pressure in clinic. She was normotensive throughout that admission and preeclampsia labs were without findings of end organ dysfunction. She received 2 doses of misoprostol for cervical ripening at that time but ultimately had minimal cervical change and did not start labor. She elected to discharge home at that time and has had 2x weekly NSTs since that time.     Prenatal Labs:  Rh negative, Rubella Immune, HIV neg, TrepAb neg, HBsAg NR, Hep C Ab NR, GC/CT neg/neg  Glucola: 103  GBS negative    ROS:  Const: denies fevers, general concerns  CV/resp: reports no concerns  GI: denies abd pain, GI concerns  : see HPI  Neuro: denies HA/vision changes    OB History    Para Term  AB Living   1             SAB IAB Ectopic Molar Multiple Live Births                    # Outcome Date GA Lbr Altaf/2nd Weight Sex Type Anes PTL Lv   1 Current                GYN: denies STIs, no cervical procedures    History reviewed. No pertinent past medical history.    History reviewed. No pertinent surgical history.    No current facility-administered medications on file prior to encounter.     Current Outpatient Medications on File Prior to Encounter   Medication Sig Dispense Refill    aspirin (ASA) 81 MG Chew Tab chewable tablet Chew 81 mg every day.      Prenatal MV-Min-Fe Fum-FA-DHA (PRENATAL 1 PO) Take  by mouth.         Family History   Problem Relation Age of Onset    Heart Disease  "Father        Social History     Tobacco Use    Smoking status: Never    Smokeless tobacco: Never   Vaping Use    Vaping status: Never Used   Substance Use Topics    Alcohol use: Never    Drug use: Never       PE:   Vitals:    24 1021 24 1030   BP: 125/73    Pulse: (!) 106    Resp: 16    Temp: 36.4 °C (97.5 °F)    TempSrc: Temporal    SpO2: 100%    Weight:  105 kg (232 lb)   Height:  1.651 m (5' 5\")     gen: AAO, NAD  abd: soft, gravid, NT  Ext: NT, no edema    SVE: 3/80/-2  @ 1130  FHT: Category I  [place]: External, rare contractions    Vertex presentation confirmed by bedside ultrasound.    A/P: 34 y.o.  @ 39w5d by LMP admitted for induction of labor for advanced maternal age.    -Admit to Labor and Delivery  -Continuous external fetal monitoring and tocometer  -Labor induction with Cook balloon, plan to augment with pitocin  -GBS negative  -Patient wishes to have epidural for pain management   -Plan for normal vaginal delivery    Karol Mckay D.O.  PGY-1  [facility]  "

## 2024-11-30 NOTE — PROGRESS NOTES
"INTRAPARTUM EVALUATION    Subjective:  Patient consented to cook catheter placement. Due to posterior cervical exam, she was placed in dorsal lithotomy position in stirrups and a speculum exam was performed. Cervix visualized and the cook catheter was placed and the inner balloon was filled to 50ml. At this moment a large gush of water consistent with AROM occurred. A fern was collected and the balloon was removed.     Objective:  /73   Pulse (!) 106   Temp 36.4 °C (97.5 °F) (Temporal)   Resp 16   Ht 1.651 m (5' 5\")   Wt 105 kg (232 lb)   LMP 2024   SpO2 100%   BMI 38.61 kg/m²   Cervix: 380/-2.  FHR tracing: Cat 1  [place]: Intermittent     Assessment and Plan:  Jami Gregory is a  at 39w5d  1. Here for IOL for AMA  - Pitocin ordered  - s/p AROM at 11:30am   2. Elevated blood pressures consistent with gHTN earlier in pregnancy  - Sent home from 37 week IOL and has been completing twice weekly surveilance   3. FHT Cat 1  4. GBS negative     Aram Rivera M.D.     "

## 2024-11-30 NOTE — ANESTHESIA PROCEDURE NOTES
Epidural Block    Date/Time: 11/30/2024 2:21 PM    Performed by: Rommel Gandara M.D.  Authorized by: Rommel Gandara M.D.    Patient Location:  OB  Start Time:  11/30/2024 2:21 PM  Reason for Block: labor analgesia    patient identified, IV checked, site marked, risks and benefits discussed, surgical consent, monitors and equipment checked and pre-op evaluation    Patient Position:  Sitting  Prep: ChloraPrep, patient draped and sterile technique    Monitoring:  Blood pressure, continuous pulse oximetry and heart rate  Approach:  Midline  Location:  L4-L5  Injection Technique:  GEORGE saline  Skin infiltration:  Lidocaine  Strength:  1%  Dose:  3ml  Needle Type:  Tuohy  Needle Gauge:  17 G  Needle Length:  3.5 in  Loss of resistance::  6  Catheter Size:  19 G  Catheter at Skin Depth:  11  Test Dose Result:  Negative

## 2024-11-30 NOTE — ANESTHESIA PREPROCEDURE EVALUATION
Date: 11/30/24  Procedure: Labor Epidural         Relevant Problems   CARDIAC   (positive) Pre-eclampsia without SF      OB   (positive) Pre-eclampsia without SF       Physical Exam    Airway   Mallampati: II  TM distance: >3 FB  Neck ROM: full       Cardiovascular - normal exam  Rhythm: regular  Rate: normal     Dental - normal exam           Pulmonary - normal exam  Breath sounds clear to auscultation     Abdominal   (+) obese     Neurological - normal exam                   Anesthesia Plan    ASA 2       Plan - epidural   Neuraxial block will be labor analgesia                  Pertinent diagnostic labs and testing reviewed    Informed Consent:    Anesthetic plan and risks discussed with patient.

## 2024-12-01 LAB
ERYTHROCYTE [DISTWIDTH] IN BLOOD BY AUTOMATED COUNT: 45.2 FL (ref 35.9–50)
HCT VFR BLD AUTO: 35.6 % (ref 37–47)
HGB BLD-MCNC: 12.4 G/DL (ref 12–16)
MCH RBC QN AUTO: 31.3 PG (ref 27–33)
MCHC RBC AUTO-ENTMCNC: 34.8 G/DL (ref 32.2–35.5)
MCV RBC AUTO: 89.9 FL (ref 81.4–97.8)
PLATELET # BLD AUTO: 134 K/UL (ref 164–446)
PMV BLD AUTO: 11.7 FL (ref 9–12.9)
RBC # BLD AUTO: 3.96 M/UL (ref 4.2–5.4)
WBC # BLD AUTO: 17.3 K/UL (ref 4.8–10.8)

## 2024-12-01 PROCEDURE — 303615 HCHG EPIDURAL/SPINAL ANESTHESIA FOR LABOR

## 2024-12-01 PROCEDURE — 700102 HCHG RX REV CODE 250 W/ 637 OVERRIDE(OP): Performed by: ADVANCED PRACTICE MIDWIFE

## 2024-12-01 PROCEDURE — 85027 COMPLETE CBC AUTOMATED: CPT

## 2024-12-01 PROCEDURE — 36415 COLL VENOUS BLD VENIPUNCTURE: CPT

## 2024-12-01 PROCEDURE — 770002 HCHG ROOM/CARE - OB PRIVATE (112)

## 2024-12-01 PROCEDURE — A9270 NON-COVERED ITEM OR SERVICE: HCPCS | Performed by: ADVANCED PRACTICE MIDWIFE

## 2024-12-01 RX ORDER — DOCUSATE SODIUM 100 MG/1
100 CAPSULE, LIQUID FILLED ORAL 2 TIMES DAILY PRN
Status: DISCONTINUED | OUTPATIENT
Start: 2024-12-01 | End: 2024-12-02 | Stop reason: HOSPADM

## 2024-12-01 RX ORDER — ACETAMINOPHEN 500 MG
1000 TABLET ORAL EVERY 6 HOURS PRN
Status: DISCONTINUED | OUTPATIENT
Start: 2024-12-01 | End: 2024-12-02 | Stop reason: HOSPADM

## 2024-12-01 RX ORDER — IBUPROFEN 800 MG/1
800 TABLET, FILM COATED ORAL EVERY 8 HOURS PRN
Status: DISCONTINUED | OUTPATIENT
Start: 2024-12-01 | End: 2024-12-02 | Stop reason: HOSPADM

## 2024-12-01 RX ORDER — MISOPROSTOL 200 UG/1
600 TABLET ORAL
Status: DISCONTINUED | OUTPATIENT
Start: 2024-12-01 | End: 2024-12-02 | Stop reason: HOSPADM

## 2024-12-01 RX ORDER — VITAMIN A ACETATE, BETA CAROTENE, ASCORBIC ACID, CHOLECALCIFEROL, .ALPHA.-TOCOPHEROL ACETATE, DL-, THIAMINE MONONITRATE, RIBOFLAVIN, NIACINAMIDE, PYRIDOXINE HYDROCHLORIDE, FOLIC ACID, CYANOCOBALAMIN, CALCIUM CARBONATE, FERROUS FUMARATE, ZINC OXIDE, CUPRIC OXIDE 3080; 12; 120; 400; 1; 1.84; 3; 20; 22; 920; 25; 200; 27; 10; 2 [IU]/1; UG/1; MG/1; [IU]/1; MG/1; MG/1; MG/1; MG/1; MG/1; [IU]/1; MG/1; MG/1; MG/1; MG/1; MG/1
1 TABLET, FILM COATED ORAL EVERY MORNING
Status: DISCONTINUED | OUTPATIENT
Start: 2024-12-01 | End: 2024-12-02 | Stop reason: HOSPADM

## 2024-12-01 RX ADMIN — DOCUSATE SODIUM 100 MG: 100 CAPSULE, LIQUID FILLED ORAL at 09:34

## 2024-12-01 RX ADMIN — ACETAMINOPHEN 1000 MG: 500 TABLET ORAL at 09:54

## 2024-12-01 RX ADMIN — IBUPROFEN 800 MG: 800 TABLET, FILM COATED ORAL at 09:54

## 2024-12-01 RX ADMIN — ACETAMINOPHEN 1000 MG: 500 TABLET ORAL at 20:49

## 2024-12-01 RX ADMIN — IBUPROFEN 800 MG: 800 TABLET, FILM COATED ORAL at 20:49

## 2024-12-01 RX ADMIN — PRENATAL WITH FERROUS FUM AND FOLIC ACID 1 TABLET: 3080; 920; 120; 400; 22; 1.84; 3; 20; 10; 1; 12; 200; 27; 25; 2 TABLET ORAL at 09:34

## 2024-12-01 ASSESSMENT — PAIN DESCRIPTION - PAIN TYPE
TYPE: ACUTE PAIN

## 2024-12-01 NOTE — L&D DELIVERY NOTE
VAGINAL DELIVERY NOTE    The patient presented at 39w5d who was admitted for an induction of labor due to AMA. She progressed to complete and pushed for a spontaneous vaginal delivery of LV female infant, apgars 8/9, wt pending. The infant was delivered to the belly and cord clamped and cut. Gases were sent. The placenta did follow spontaneously and was noted to be intact. The uterus was not explored.  Laceration(s) repaired with 2-0 vicryl in typical fashion.   mL.    Aram Rivera M.D.  November 30, 2024

## 2024-12-01 NOTE — PROGRESS NOTES
- Report received from Miguelina BILL at , care assumed. [place] Gestation today at 39-5 Weeks    Patient denies ill feeling, reports +FM, +ROM, -VB. No change to vision/edema/HA; denies dizziness or weakness.   Use of FM/IUPC discussed and in place, discussed POC; ongoing as needed.      Patient/FOB deny questions/concerns regarding care since arrival to [facility].     - Pt calls out reporting pain. SVE performed; as charted in flowsheets    - Delivery of viable female infant via  with Miguel GÓMEZ. See provider note and delivery summary for details.

## 2024-12-01 NOTE — ANESTHESIA POSTPROCEDURE EVALUATION
Patient: Jami Gregory    Procedure Summary       Date: 11/30/24 Room / Location:     Anesthesia Start: 1412 Anesthesia Stop: 2134    Procedure: Labor Epidural Diagnosis:     Scheduled Providers:  Responsible Provider: Rommel Gandara M.D.    Anesthesia Type: epidural ASA Status: 2            Final Anesthesia Type: epidural  Last vitals  BP   Blood Pressure: 130/81    Temp   36.6 °C (97.8 °F)    Pulse   78   Resp   18    SpO2   95 %      Anesthesia Post Evaluation    Patient location during evaluation: PACU  Patient participation: complete - patient participated  Level of consciousness: awake and alert  Pain score: 0    Airway patency: patent  Anesthetic complications: no  Cardiovascular status: hemodynamically stable  Respiratory status: acceptable  Hydration status: euvolemic    PONV: none          No notable events documented.

## 2024-12-01 NOTE — CARE PLAN
The patient is Watcher - Medium risk of patient condition declining or worsening    Shift Goals  Clinical Goals: Cervical Change and Dilation  Patient Goals: Healthy Mom; Healthy Baby  Family Goals: Comfort and Support    Progress made toward(s) clinical / shift goals:      Problem: Knowledge Deficit - L&D  Goal: Patient and family/caregivers will demonstrate understanding of plan of care, disease process/condition, diagnostic tests and medications  Outcome: Progressing     Problem: Psychosocial - L&D  Goal: Patient's level of anxiety will decrease  Outcome: Progressing     Problem: Risk for Injury  Goal: Patient and fetus will be free of preventable injury/complications  Outcome: Progressing       Patient is not progressing towards the following goals:

## 2024-12-01 NOTE — PROGRESS NOTES
EDC - 12/ EGA - 39-5    1004 - Pt arrived to labor and delivery for IOL - AMA. Pt placed in room S220.  1021 - External monitors in place X2. Category I FHT at this time. VSS. Pt reports good FM. No complaints of contractions, ROM or vaginal bleeding. SVE 1-2/-3. FOB at bedside. POC discussed with pt and family members, all questions answered.   1135 - Dr Rivera at bedside. Cook's catheter balloon attempted. ROM upon attempting inflation. Cooks removed. New orders received.   1200 - Fetal tachycardia noted. Dr Rivera and Dr Mckay aware. See flowsheet and MAR charting.   1344 - Strip reviewed with Dr Rivera. Continue with POC. Starting pitocin. Pt agreeable with POC. Pt requesting epidural.   1410 - Dr Gandara at bedside for epidural placement.   1424 - Negative test dose.   1445 - Pt comfortable.   1900 - Report given. POC discussed.

## 2024-12-01 NOTE — PROGRESS NOTES
Obstetrics & Gynecology Post-Delivery Progress Note    Date of Service  24    34 y.o.  s/p vaginal, spontaneous delivery.    Delivery date: 24 at 2134.     IOL for pregnancy complicated by Ke DREW. Delivery uncomplicated.     Subjective  Pt reports she is feeling well overall. Sh is tired and having some mild cramping pain that is well managed with ibuprofen.  Pain: No  Bleeding: lochia less than regular menstrual bleeding  Tolerating PO: Yes  Voiding: without difficulty  Ambulating: yes  Passing flatus: Yes  Feeding: breastfeeding well    Objective  24hr VS:  Temp:  [36.3 °C (97.3 °F)-36.6 °C (97.8 °F)] 36.4 °C (97.5 °F)  Pulse:  [] 82  Resp:  [17-18] 17  BP: ()/(50-89) 125/86  SpO2:  [94 %-98 %] 97 %    Physical Exam  Gen: well and resting  CV: RRR, nl S1 and S2, no murmur  Resp: unlabored respirations, no intercostal retractions or accessory muscle use, clear to auscultation without rales or wheezes  Chest/Breasts: exam deferred  Abd: nontender, normal bowel sounds, soft  Fundus: firm, below umbilicus, and nontender  Incision: not applicable, (vaginal delivery)  Perineum: deferred  Ext: symmetric and no edema, calves nontender    Labs:  Recent Results (from the past 48 hours)   Hold Blood Bank Specimen (Not Tested)    Collection Time: 24 10:45 AM   Result Value Ref Range    Holding Tube - Bb DONE    CBC with differential    Collection Time: 24 10:45 AM   Result Value Ref Range    WBC 12.8 (H) 4.8 - 10.8 K/uL    RBC 4.45 4.20 - 5.40 M/uL    Hemoglobin 14.2 12.0 - 16.0 g/dL    Hematocrit 39.5 37.0 - 47.0 %    MCV 88.8 81.4 - 97.8 fL    MCH 31.9 27.0 - 33.0 pg    MCHC 35.9 (H) 32.2 - 35.5 g/dL    RDW 44.6 35.9 - 50.0 fL    Platelet Count 141 (L) 164 - 446 K/uL    MPV 12.0 9.0 - 12.9 fL    Neutrophils-Polys 79.30 (H) 44.00 - 72.00 %    Lymphocytes 16.20 (L) 22.00 - 41.00 %    Monocytes 2.80 0.00 - 13.40 %    Eosinophils 0.30 0.00 - 6.90 %    Basophils 0.20 0.00 - 1.80 %     Immature Granulocytes 1.20 (H) 0.00 - 0.90 %    Nucleated RBC 0.00 0.00 - 0.20 /100 WBC    Neutrophils (Absolute) 10.15 (H) 1.82 - 7.42 K/uL    Lymphs (Absolute) 2.07 1.00 - 4.80 K/uL    Monos (Absolute) 0.36 0.00 - 0.85 K/uL    Eos (Absolute) 0.04 0.00 - 0.51 K/uL    Baso (Absolute) 0.03 0.00 - 0.12 K/uL    Immature Granulocytes (abs) 0.16 (H) 0.00 - 0.11 K/uL    NRBC (Absolute) 0.00 K/uL   T.PALLIDUM AB DELMIS (Syphilis)    Collection Time: 11/30/24 10:45 AM   Result Value Ref Range    Syphilis, Treponemal Qual Nonreactive Non-Reactive   Comp Metabolic Panel    Collection Time: 11/30/24 10:45 AM   Result Value Ref Range    Sodium 137 135 - 145 mmol/L    Potassium 3.6 3.6 - 5.5 mmol/L    Chloride 106 96 - 112 mmol/L    Co2 18 (L) 20 - 33 mmol/L    Anion Gap 13.0 7.0 - 16.0    Glucose 124 (H) 65 - 99 mg/dL    Bun 10 8 - 22 mg/dL    Creatinine 0.74 0.50 - 1.40 mg/dL    Calcium 9.4 8.5 - 10.5 mg/dL    Correct Calcium 9.7 8.5 - 10.5 mg/dL    AST(SGOT) 17 12 - 45 U/L    ALT(SGPT) 8 2 - 50 U/L    Alkaline Phosphatase 142 (H) 30 - 99 U/L    Total Bilirubin 0.3 0.1 - 1.5 mg/dL    Albumin 3.6 3.2 - 4.9 g/dL    Total Protein 6.4 6.0 - 8.2 g/dL    Globulin 2.8 1.9 - 3.5 g/dL    A-G Ratio 1.3 g/dL   ESTIMATED GFR    Collection Time: 11/30/24 10:45 AM   Result Value Ref Range    GFR (CKD-EPI) 108 >60 mL/min/1.73 m 2   FERN TEST    Collection Time: 11/30/24 11:40 AM   Result Value Ref Range    Fern Test On Amniotic Fluid see below Not present   PROTEIN/CREAT RATIO URINE    Collection Time: 11/30/24  3:30 PM   Result Value Ref Range    Total Protein, Urine <4.0 0.0 - 15.0 mg/dL    Creatinine, Random Urine 23.76 mg/dL    Protein Creatinine Ratio see below 10 - 107 mg/g   CBC without differential- Once in 8 hours post delivery    Collection Time: 12/01/24  5:49 AM   Result Value Ref Range    WBC 17.3 (H) 4.8 - 10.8 K/uL    RBC 3.96 (L) 4.20 - 5.40 M/uL    Hemoglobin 12.4 12.0 - 16.0 g/dL    Hematocrit 35.6 (L) 37.0 - 47.0 %    MCV  89.9 81.4 - 97.8 fL    MCH 31.3 27.0 - 33.0 pg    MCHC 34.8 32.2 - 35.5 g/dL    RDW 45.2 35.9 - 50.0 fL    Platelet Count 134 (L) 164 - 446 K/uL    MPV 11.7 9.0 - 12.9 fL        Medications  prenatal plus vitamin, 1 Tablet, Oral, QAM  oxytocin, 10 Units, Intravenous, Once      PRN medications: oxytocin, misoprostol, docusate sodium, magnesium hydroxide, ibuprofen, acetaminophen    Assessment/Plan  Jami Gregory is a 34 y.o.yo  postpartum day #1  s/p vaginal, spontaneous delivery    - Post care: meeting all goals  -  is Rh negative, Rhogam not ordered  - Pain: controlled   - Rh-, Rubella Immune  - Method of Feeding: plans to breastfeed  - Method of Contraception: undecided, options for POP, depot provera, and possibly Nexplanon on a weekday if available at hospital were discussed.    VTE prophylaxis: patient ambulating    - Disposition: likely home PPD 2       Karol Mckay D.O.   PGY-1  [facility]        I reviewed patient's clinical course and discussed the management with the resident. I reviewed the resident's note and agree with the documented findings and plan of care.    Additional attending comments:  Patient is PPD#1 s/p . She is meeting appropriate milestones. Has stable vital signs and reassuring H/H trend. Undecided regarding postpartum contraception.    Isamar Philip MD MPH

## 2024-12-01 NOTE — PROGRESS NOTES
0700: Received report from Key Nelson.  Patient in postpartum bed, patient comfortable and alert.  Patient assessment completed, fundus firm and palpable, lochia light rubra. Pain management and interventions discussed with pt.  plan of care reviewed,  and verbalized understanding and will call if needs anything.

## 2024-12-01 NOTE — PROGRESS NOTES
Patient admited to postpartum from L&D via wheelchair. IV saline locked Fundus firm at 1 below umbilicus with scant lochia. VSS. Oriented to surroundings and use of call light,emergency buttons and use of bulb syringe on infant. Instructed on breast feeding frequency and filling out birth certificate and EPDS form and recording infant's Is and Os. Patient was not able to void in L& D as patient's right leg was still still slightly numb so encouraged fluid intake and instructed to call when needing to go to the bathroom. Denies pain at this time.

## 2024-12-01 NOTE — LACTATION NOTE
This note was copied from a baby's chart.  Initial Visit:    KAROL, , delivered her baby girl yesterday, 24, [id number] at 39.5 weeks gestation.    Risk factor for breastfeeding is: AMA.      History of BF: first baby     Report of Current Breastfeeding Status: Mom states baby has been feeding well. Mom does hear swallows. She states her nipples aer slightly tender.  reviewed nipple care and proper latch.    Breastfeeding Assistance: Baby latched with minimal assist on left side. Mom had already placed baby on right side for about 20 minutes    Plan:   Reviewed normal  behavior and feeding patterns.   Encouraged to do skin to skin during waking hours and feed when noting early feeding cues of licking lips, stirring in sleep and putting hand to mouth.    Offer both breasts at every feeding.  Observe for voids and stools and document on feeding log. Noted stool's transitioning color       MOB was provided with a list of community breastfeeding resources.    MOB verbalized understanding of all information provided to her and denied having any lactation questions and/or concerns at this time.

## 2024-12-01 NOTE — CARE PLAN
Problem: Risk for Fluid Imbalance  Goal: Patient's fluid volume balance will be maintained or improve  Description: Target End Date:  1 to 3 days    1.  Assess for signs and symptoms of postpartum and postoperative bleeding as appropriate  2.  Monitor drainage for color, consistency, quantity  3.  Report inadequate intake or output to provider  4.  Promote oral intake as appropriate  5.  Administer IV therapy as ordered  6.  Discontinue IV fluids when tolerating PO or per provider order  Outcome: Progressing     Problem: Risk for Injury  Goal: Patient and fetus will be free of preventable injury/complications  Description: Target End Date:  Prior to discharge or change in level of care    1.  Monitor uterine activity and if applicable progression of labor  2.  Monitor fetal well being  3.  Assure resuscitative equipment is available and within reach  Outcome: Progressing   The patient is Stable - Low risk of patient condition declining or worsening    Shift Goals  Clinical Goals: Make cervical change  Patient Goals: Have a baby    Progress made toward(s) clinical / shift goals:  EFM and TOCO in place to monitor for fetal wellbeing and uterine activity. Pain assessed and treated regularly and appropriately.     Patient is not progressing towards the following goals:

## 2024-12-01 NOTE — ANESTHESIA TIME REPORT
Anesthesia Start and Stop Event Times       Date Time Event    11/30/2024 1359 Ready for Procedure     1412 Anesthesia Start     2134 Anesthesia Stop          Responsible Staff  11/30/24      Name Role Begin End    Rommel Gandara M.D. Anesth 1412 2134          Overtime Reason:  no overtime (within assigned shift)    Comments:                                                          
No

## 2024-12-01 NOTE — CARE PLAN
The patient is Stable - Low risk of patient condition declining or worsening    Shift Goals  Clinical Goals: VSS,light lochia,void without difficulty post delivery  Patient Goals: breast feed,rest  Family Goals: assist and support    Progress made toward(s) clinical / shift goals:  progressing    Patient is not progressing towards the following goals:

## 2024-12-01 NOTE — CARE PLAN
The patient is Stable - Low risk of patient condition declining or worsening    Shift Goals  Clinical Goals: stable vital signs  Patient Goals: rest  Family Goals: bonding    Progress made toward(s) clinical / shift goals:  Patient will verbalize any concerns for either infant or self, asking appropriate question regarding infants care needs, and understanding self care needs as well.     Patient will continue to show no signs or symptoms of infection, with no increase redness, pain or bleeding to incision site with stable vital signs.    Patient is not progressing towards the following goals:

## 2024-12-02 ENCOUNTER — APPOINTMENT (OUTPATIENT)
Dept: OBGYN | Facility: CLINIC | Age: 34
End: 2024-12-02
Payer: COMMERCIAL

## 2024-12-02 ENCOUNTER — PHARMACY VISIT (OUTPATIENT)
Dept: PHARMACY | Facility: MEDICAL CENTER | Age: 34
End: 2024-12-02
Payer: COMMERCIAL

## 2024-12-02 VITALS
WEIGHT: 232 LBS | DIASTOLIC BLOOD PRESSURE: 62 MMHG | HEIGHT: 65 IN | SYSTOLIC BLOOD PRESSURE: 109 MMHG | OXYGEN SATURATION: 97 % | HEART RATE: 68 BPM | RESPIRATION RATE: 17 BRPM | TEMPERATURE: 96.6 F | BODY MASS INDEX: 38.65 KG/M2

## 2024-12-02 PROCEDURE — RXMED WILLOW AMBULATORY MEDICATION CHARGE: Performed by: PHYSICIAN ASSISTANT

## 2024-12-02 PROCEDURE — A9270 NON-COVERED ITEM OR SERVICE: HCPCS | Performed by: ADVANCED PRACTICE MIDWIFE

## 2024-12-02 PROCEDURE — 700102 HCHG RX REV CODE 250 W/ 637 OVERRIDE(OP): Performed by: ADVANCED PRACTICE MIDWIFE

## 2024-12-02 RX ORDER — PSEUDOEPHEDRINE HCL 30 MG
100 TABLET ORAL 2 TIMES DAILY PRN
Qty: 60 CAPSULE | Refills: 0 | Status: SHIPPED | OUTPATIENT
Start: 2024-12-02

## 2024-12-02 RX ORDER — IBUPROFEN 800 MG/1
800 TABLET, FILM COATED ORAL EVERY 8 HOURS PRN
Qty: 30 TABLET | Refills: 0 | Status: SHIPPED | OUTPATIENT
Start: 2024-12-02

## 2024-12-02 RX ADMIN — PRENATAL WITH FERROUS FUM AND FOLIC ACID 1 TABLET: 3080; 920; 120; 400; 22; 1.84; 3; 20; 10; 1; 12; 200; 27; 25; 2 TABLET ORAL at 09:21

## 2024-12-02 RX ADMIN — IBUPROFEN 800 MG: 800 TABLET, FILM COATED ORAL at 09:21

## 2024-12-02 RX ADMIN — DOCUSATE SODIUM 100 MG: 100 CAPSULE, LIQUID FILLED ORAL at 09:21

## 2024-12-02 ASSESSMENT — PAIN DESCRIPTION - PAIN TYPE: TYPE: ACUTE PAIN

## 2024-12-02 ASSESSMENT — EDINBURGH POSTNATAL DEPRESSION SCALE (EPDS)
I HAVE BLAMED MYSELF UNNECESSARILY WHEN THINGS WENT WRONG: NOT VERY OFTEN
I HAVE LOOKED FORWARD WITH ENJOYMENT TO THINGS: AS MUCH AS I EVER DID
THE THOUGHT OF HARMING MYSELF HAS OCCURRED TO ME: NEVER
THINGS HAVE BEEN GETTING ON TOP OF ME: NO, I HAVE BEEN COPING AS WELL AS EVER
I HAVE BEEN SO UNHAPPY THAT I HAVE BEEN CRYING: NO, NEVER
I HAVE BEEN SO UNHAPPY THAT I HAVE HAD DIFFICULTY SLEEPING: NOT AT ALL
I HAVE BEEN ABLE TO LAUGH AND SEE THE FUNNY SIDE OF THINGS: AS MUCH AS I ALWAYS COULD
I HAVE FELT SCARED OR PANICKY FOR NO GOOD REASON: NO, NOT AT ALL
I HAVE BEEN ANXIOUS OR WORRIED FOR NO GOOD REASON: NO, NOT AT ALL
I HAVE FELT SAD OR MISERABLE: NO, NOT AT ALL

## 2024-12-02 NOTE — PROGRESS NOTES
A bedside report received from Helene RN @ 0700.  Assumed care.     @ 0900: Discussed plan of care. Assessment done. Medicated for pain. Given and explained to the patient her home medication. The patient knows when she can have her next dose of Ibuprofen if need. Call light is within reach. Encouraged to call if with needs. Will check at intervals.

## 2024-12-02 NOTE — PROGRESS NOTES
Discharge instruction discussed. She have received her medications for home. Emphasized the importance of  screening follow-up test. Questions and concerns have been answered. Baby's ID band matches with the patient. She is doing well. Discharged home. Escorted out by Sofi GUERRERO.

## 2024-12-02 NOTE — CARE PLAN
Problem: Knowledge Deficit - Postpartum  Goal: Patient will verbalize and demonstrate understanding of self and infant care  Outcome: Progressing     Problem: Altered Physiologic Condition  Goal: Patient physiologically stable as evidenced by normal lochia, palpable uterine involution and vitals within normal limits  Outcome: Progressing   The patient is Stable - Low risk of patient condition declining or worsening    Shift Goals  Clinical Goals: stable VS and lochia WNL  Patient Goals: rest  Family Goals: support    Progress made toward(s) clinical / shift goals:    Pt reports comfort after pain interventions, Fundus firm and lochia light, VSS, educated on POC, needs met at this time. Questions answered. Will continue to educate.

## 2024-12-02 NOTE — DISCHARGE INSTRUCTIONS
PATIENT DISCHARGE EDUCATION INSTRUCTION SHEET    REASONS TO CALL YOUR OBSTETRICIAN  Persistent fever, shaking, chills (Temperature higher than 100.4) may indicate you have an infection  Heavy bleeding: soaking more than 1 pad per hour; Passing clots an egg-sized clot or bigger may mean you have an postpartum hemorrhage  Foul odor from vagina or bad smelling or discolored discharge or blood  Breast infection (Mastitis symptoms); breast pain, chills, fever, redness or red streaks, may feel flu like symptoms  Urinary pain, burning or frequency  Incision that is not healing, increased redness, swelling, tenderness or pain, or any pus from episiotomy or  site may mean you have an infection  Redness, swelling, warmth, or painful to touch in the calf area of your leg may mean you have a blood clot  Severe or intensified depression, thoughts or feelings of wanting to hurt yourself or someone else   Pain in chest, obstructed breathing or shortness of breath (trouble catching your breath) may mean you are having a postpartum complication. Call your provider immediately   Headache that does not get better, even after taking medicine, a bad headache with vision changes or pain in the upper right area of your belly may mean you have high blood pressure or post birth preeclampsia. Call your provider immediately    HAND WASHING  All family and friends should wash their hands:  Before and after holding the baby  Before feeding the baby  After using the restroom or changing the baby's diaper    WOUND CARE  Ask your physician for additional care instructions. In general:  Episiotomy/Laceration  May use shaq-spray bottle, witch hazel pads and dermaplast spray for comfort  Use shaq-spray bottle after urinating to cleanse perineal area  To prevent burning during urination spray shaq-water bottle on labial area   Pat perineal area dry until episiotomy/laceration is healed  Continue to use shaq-bottle until bleeding stops as  needed  If have a 2nd degree laceration or greater, a Sitz bath can offer relief from soreness, burning, and inflammation   Sitz Bath   Sit in 6 inches of warm water and soak laceration as needed until the laceration heals    VAGINAL CARE AND BLEEDING  Nothing inside vagina for 6 weeks:   No sexual intercourse, tampons or douching  Bleeding may continue for 2-4 weeks. Amount and color may vary  Soaking 1 pad or more in an hour for several hours is considered heavy bleeding  Passing large egg sized blood clots can be concerning  If you feel like you have heavy bleeding or are having increasing amount of blood clots call your Obstetrician immediately  If you begin feeling faint upon standing, feeling sick to your stomach, have clammy skin, a really fast heartbeat, have chills, start feeling confused, dizzy, sleepy or weak, or feeling like you're going to faint call your Obstetrician immediately    HYPERTENSION   Preeclampsia or gestational hypertension are types of high blood pressure that only pregnant women can get. It is important for you to be aware of symptoms to seek early intervention and treatment. If you have any of these symptoms immediately call your Obstetrician    Vision changes or blurred vision   Severe headache or pain that is unrelieved with medication and will not go away  Persistent pain in upper abdomen or shoulder   Increased swelling of face, feet, or hands  Difficulty breathing or shortness of breath at rest  Urinating less than usual    URINATION AND BOWEL MOVEMENTS  Eating more fiber (bran cereal, fruits, and vegetables) and drinking plenty of fluids will help to avoid constipation  Urinary frequency and urgency after childbirth is normal  If you experience any urinary pain, burning or frequency call your provider    BIRTH CONTROL  It is possible to become pregnant at any time after delivery and while breastfeeding  Plan to discuss a method of birth control with your physician at your post  "delivery follow up visit    POSTPARTUM BLUES  During the first few days after birth, you may experience a sense of the \"blues\" which may include impatience, irritability or even crying. These feelings come and go quickly. However, as many as 1 in 10 women experience emotional symptoms known as postpartum depression.     POSTPARTUM DEPRESSION    May start as early as the second or third day after delivery or take several weeks or months to develop. Symptoms of \"blues\" are present, but are more intense: Crying spells; loss of appetite; feelings of hopelessness or loss of control; fear of touching the baby; over concern or no concern at all about the baby; little or no concern about your own appearance/caring for yourself; and/or inability to sleep or excessive sleeping. Contact your Obstetrician if you are experiencing any of these symptoms     PREVENTING SHAKEN BABY  If you are angry or stressed, PUT THE BABY IN THE CRIB, step away, take some deep breaths, and wait until you are calm to care for the baby. DO NOT SHAKE THE BABY. You are not alone, call a supporter for help.  [facility] 24/7 crisis call line (319-984-2097) or (1-782.836.4709)  You can also text them, text \"ANSWER\" (871362)  "

## 2024-12-02 NOTE — DISCHARGE SUMMARY
Discharge Summary:      Jami Gregory    Admit Date:   2024  Discharge Date:  2024     Admitting diagnosis:  Encounter for induction of labor [Z34.90]  Discharge Diagnosis: Status post vaginal, spontaneous.  Pregnancy Complications: PreE, GHTN  Tubal Ligation:  no        History:  History reviewed. No pertinent past medical history.  OB History    Para Term  AB Living   1 1 1     1   SAB IAB Ectopic Molar Multiple Live Births           0 1      # Outcome Date GA Lbr Altaf/2nd Weight Sex Type Anes PTL Lv   1 Term 24 39w5d / 01:51 3.43 kg (7 lb 9 oz) F Vag-Spont EPI N PIOTR        Patient has no known allergies.  Patient Active Problem List    Diagnosis Date Noted    Postpartum care following vaginal delivery 2024    Encounter for induction of labor 2024    Labor and delivery, indication for care 2024    Pre-eclampsia without SF 10/01/2024    Rh negative state in antepartum period 2024    Elevated blood pressure reading 2024    Multigravida of advanced maternal age in third trimester 2024        Hospital Course:   34 y.o. , now para 1, was admitted with the above mentioned diagnosis, underwent Active Labor, vaginal, spontaneous. Patient postpartum course was unremarkable, with progressive advancement in diet , ambulation and toleration of oral analgesia. Patient without complaints today and desires discharge.      Vitals:    24 0200 24 0600 24 1800 24 0547   BP: 124/79 125/86 126/78 109/62   Pulse: 81 82 76 68   Resp: 17 17 18 17   Temp: 36.4 °C (97.6 °F) 36.4 °C (97.5 °F) 36.4 °C (97.6 °F) 35.9 °C (96.6 °F)   TempSrc: Temporal Temporal Temporal Temporal   SpO2: 96% 97% 97% 97%   Weight:       Height:           Current Facility-Administered Medications   Medication Dose    PRN oxytocin (PITOCIN) (20 Units/1000 mL) PRN for excessive uterine bleeding - See Admin Instr  125-999 mL/hr    miSOPROStol (Cytotec) tablet 600  mcg  600 mcg    docusate sodium (Colace) capsule 100 mg  100 mg    magnesium hydroxide (Milk Of Magnesia) suspension 30 mL  30 mL    ibuprofen (Motrin) tablet 800 mg  800 mg    acetaminophen (Tylenol) tablet 1,000 mg  1,000 mg    prenatal plus vitamin (Stuartnatal 1+1) 27-1 MG tablet 1 Tablet  1 Tablet    oxytocin (Pitocin) infusion (for post delivery)  125 mL/hr    oxytocin (Pitocin) infusion (for induction)  0-20 teo-units/min    lactated ringers infusion      ropivacaine 0.2 % (Naropin) injection         Exam:  Breast Exam: negative  Abdomen: Abdomen soft, non-tender. BS normal. No masses,  No organomegaly  Fundus Non Tender: yes  Incision: none  Perineum: perineum intact  Extremity: extremities, peripheral pulses and reflexes normal     Labs:  Recent Labs     11/30/24  1045 12/01/24  0549   WBC 12.8* 17.3*   RBC 4.45 3.96*   HEMOGLOBIN 14.2 12.4   HEMATOCRIT 39.5 35.6*   MCV 88.8 89.9   MCH 31.9 31.3   MCHC 35.9* 34.8   RDW 44.6 45.2   PLATELETCT 141* 134*   MPV 12.0 11.7        Activity:   Discharge to home  Pelvic Rest x 6 weeks    Assessment:  normal postpartum course  Discharge Assessment: No heavy bleeding or foul vaginal discharge      Follow up: [facility] or [facility] in 5 weeks for vaginal ; 1 week for incision check.      Discharge Meds:   Current Outpatient Medications   Medication Sig Dispense Refill    docusate sodium 100 MG Cap Take 100 mg by mouth 2 times a day as needed for Constipation. 60 Capsule 0    ibuprofen (MOTRIN) 800 MG Tab Take 1 Tablet by mouth every 8 hours as needed for Mild Pain. 30 Tablet 0       Tamsen KARRIE James, P.A.

## 2024-12-02 NOTE — DISCHARGE PLANNING
Discharge Planning Assessment Post Partum    Reason for Referral: MOB tearful with cares and breast feeding.  Concern of post partum depression  Address: [facility] [place] [address]  Phone: [phone]  Type of Living Situation: stable housing   Mom Diagnosis: Pregnancy, vaginal delivery   Baby Diagnosis: [place]-39.5 weeks   Primary Language: English     Name of Baby: Funmilayo Osborn (: 24)  Father of the Baby: Colin Osborn   Involved in baby’s care? Yes  Contact Information: [phone]    [facility]atal Care: Yes-[facility]   Mom's PCP: No PCP listed   PCP for new baby: Dr. 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

## 2024-12-18 NOTE — DOCUMENTATION QUERY
[facility]                                                                       Query Response Note      PATIENT:               FORD DOWNS  ACCT #:                  [id number]  MRN:                     [id number]  :                      1990  ADMIT DATE:       2024 10:04 AM  DISCH DATE:        2024 12:10 PM  RESPONDING  PROVIDER #:        [id number]           QUERY TEXT:    Please confirm the location and complexity of the laceration that occurred during delivery.        Thank you,   Julita [email or web address]    The patient's clinical indicators include:  Documentation states patient sustained a laceration that required repair during delivery.     Laceration(s) repaired with 2-0 vicryl in typical fashion.   mL.  Options provided:   -- First degree laceration   -- Second degree laceration   -- Third degree laceration   -- Periurethra or vaginal   -- Other specified, (Please specify the other explanation)   -- Unable to determine      Query created by: Heike Lee on 2024 9:20 AM    RESPONSE TEXT:    Second degree laceration          Electronically signed by:  KATJA FIGUEROA 2024 12:39 PM

## 2025-01-09 ENCOUNTER — POST PARTUM (OUTPATIENT)
Dept: OBGYN | Facility: CLINIC | Age: 35
End: 2025-01-09
Payer: COMMERCIAL

## 2025-01-09 VITALS — DIASTOLIC BLOOD PRESSURE: 87 MMHG | WEIGHT: 213.8 LBS | BODY MASS INDEX: 35.58 KG/M2 | SYSTOLIC BLOOD PRESSURE: 129 MMHG

## 2025-01-09 DIAGNOSIS — Z30.011 ENCOUNTER FOR INITIAL PRESCRIPTION OF CONTRACEPTIVE PILLS: ICD-10-CM

## 2025-01-09 PROCEDURE — 0503F POSTPARTUM CARE VISIT: CPT | Performed by: OBSTETRICS & GYNECOLOGY

## 2025-01-09 PROCEDURE — 3079F DIAST BP 80-89 MM HG: CPT | Performed by: OBSTETRICS & GYNECOLOGY

## 2025-01-09 PROCEDURE — 3074F SYST BP LT 130 MM HG: CPT | Performed by: OBSTETRICS & GYNECOLOGY

## 2025-01-09 RX ORDER — ACETAMINOPHEN AND CODEINE PHOSPHATE 120; 12 MG/5ML; MG/5ML
1 SOLUTION ORAL DAILY
Qty: 28 TABLET | Refills: 11 | Status: SHIPPED | OUTPATIENT
Start: 2025-01-09

## 2025-01-09 ASSESSMENT — FIBROSIS 4 INDEX: FIB4 SCORE: 1.57

## 2025-01-09 ASSESSMENT — EDINBURGH POSTNATAL DEPRESSION SCALE (EPDS)
I HAVE BLAMED MYSELF UNNECESSARILY WHEN THINGS WENT WRONG: NOT VERY OFTEN
I HAVE BEEN ABLE TO LAUGH AND SEE THE FUNNY SIDE OF THINGS: AS MUCH AS I ALWAYS COULD
THE THOUGHT OF HARMING MYSELF HAS OCCURRED TO ME: NEVER
I HAVE FELT SAD OR MISERABLE: NO, NOT AT ALL
TOTAL SCORE: 1
I HAVE BEEN SO UNHAPPY THAT I HAVE HAD DIFFICULTY SLEEPING: NOT AT ALL
THINGS HAVE BEEN GETTING ON TOP OF ME: NO, I HAVE BEEN COPING AS WELL AS EVER
I HAVE LOOKED FORWARD WITH ENJOYMENT TO THINGS: AS MUCH AS I EVER DID
I HAVE BEEN ANXIOUS OR WORRIED FOR NO GOOD REASON: NO, NOT AT ALL
I HAVE FELT SCARED OR PANICKY FOR NO GOOD REASON: NO, NOT AT ALL
I HAVE BEEN SO UNHAPPY THAT I HAVE BEEN CRYING: NO, NEVER

## 2025-01-09 NOTE — PROGRESS NOTES
POST PARTUM VISIT NOTE    SUBJECTIVE:  Jami Gregory is a 34 y.o.  who presents for postpartum exam.   I have reviewed the prenatal and intrapartum course.     Date of delivery :  2024  Type of delivery:      ROS:  She feels well healed.   Laceration is well healed.   Bleeding lasted about 5 weeks.  She is getting along well with her partner.   She reports her mood is good. Denies postpartum blues.   She has not had sex.   Denies urinary or stool incontinence.    Last pap date:      Delivery laceration(s): yes - 2nd degree  Feeding: Breast and bottle  Postpartum course: uncomplicated  Contraceptive plans: POP due to breastfeeding, planning on switching to OCP after breastfeeding     I have reviewed the patient's PMH for contraceptive risk factors, and she has no contraindications to contraception as planned.     OBJECTIVE:    Vitals:    25 0939   BP: 129/87       Appears well, vital signs normal.  Breast Exam: exam deferred.  Abdomen: soft, nontender  Pelvic Exam:  Perineum: perineum intact and well healed  Vulva: No external lesions, normal hair distribution, no adenopathy  Urethra: appears normal with no lesions    EPDS score:  1    ASSESSMENT:  normal postpartum course    PLAN:  RTO for annual exam due in 4 months  Contraception planned:  POP  Counseled to continue prenatal vitamin while breastfeeding  Counseled on option of pelvic floor PT, declines referral at this time    Aram Rivera M.D.

## 2025-01-09 NOTE — PROGRESS NOTES
Pt is here today for postpartum visit.   Delivery type : vaginal  Complications with delivery : none  Currently breast and formula feeding   LMP :  BCM : pill  Last pap : 05/18/24  EPDS : 1  Pt states she is interesting in BC pill

## 2025-02-07 ENCOUNTER — PATIENT MESSAGE (OUTPATIENT)
Dept: OBGYN | Facility: CLINIC | Age: 35
End: 2025-02-07
Payer: COMMERCIAL

## 2025-03-05 DIAGNOSIS — Z30.9 ENCOUNTER FOR CONTRACEPTIVE MANAGEMENT, UNSPECIFIED TYPE: ICD-10-CM

## 2025-03-05 RX ORDER — NORETHINDRONE ACETATE AND ETHINYL ESTRADIOL 1.5-30(21)
1 KIT ORAL DAILY
Qty: 84 TABLET | Refills: 3 | Status: SHIPPED | OUTPATIENT
Start: 2025-03-05